# Patient Record
Sex: MALE | Race: WHITE
[De-identification: names, ages, dates, MRNs, and addresses within clinical notes are randomized per-mention and may not be internally consistent; named-entity substitution may affect disease eponyms.]

---

## 2020-09-08 ENCOUNTER — HOSPITAL ENCOUNTER (EMERGENCY)
Dept: HOSPITAL 97 - ER | Age: 65
Discharge: HOME | End: 2020-09-08
Payer: COMMERCIAL

## 2020-09-08 VITALS — SYSTOLIC BLOOD PRESSURE: 157 MMHG | DIASTOLIC BLOOD PRESSURE: 77 MMHG | OXYGEN SATURATION: 96 %

## 2020-09-08 VITALS — TEMPERATURE: 98.1 F

## 2020-09-08 DIAGNOSIS — R11.2: Primary | ICD-10-CM

## 2020-09-08 DIAGNOSIS — E11.9: ICD-10-CM

## 2020-09-08 LAB
ALBUMIN SERPL BCP-MCNC: 4.3 G/DL (ref 3.4–5)
ALP SERPL-CCNC: 94 U/L (ref 45–117)
ALT SERPL W P-5'-P-CCNC: 40 U/L (ref 12–78)
AST SERPL W P-5'-P-CCNC: 16 U/L (ref 15–37)
BLD SMEAR INTERP: (no result)
BUN BLD-MCNC: 17 MG/DL (ref 7–18)
GLUCOSE SERPLBLD-MCNC: 144 MG/DL (ref 74–106)
HCT VFR BLD CALC: 42.7 % (ref 39.6–49)
LIPASE SERPL-CCNC: 52 U/L (ref 73–393)
LYMPHOCYTES # SPEC AUTO: 0.9 K/UL (ref 0.7–4.9)
MORPHOLOGY BLD-IMP: (no result)
PMV BLD: 9 FL (ref 7.6–11.3)
POTASSIUM SERPL-SCNC: 4.2 MMOL/L (ref 3.5–5.1)
RBC # BLD: 4.9 M/UL (ref 4.33–5.43)
UA COMPLETE W REFLEX CULTURE PNL UR: (no result)

## 2020-09-08 PROCEDURE — 96374 THER/PROPH/DIAG INJ IV PUSH: CPT

## 2020-09-08 PROCEDURE — 96361 HYDRATE IV INFUSION ADD-ON: CPT

## 2020-09-08 PROCEDURE — 80076 HEPATIC FUNCTION PANEL: CPT

## 2020-09-08 PROCEDURE — 81003 URINALYSIS AUTO W/O SCOPE: CPT

## 2020-09-08 PROCEDURE — 74176 CT ABD & PELVIS W/O CONTRAST: CPT

## 2020-09-08 PROCEDURE — 81015 MICROSCOPIC EXAM OF URINE: CPT

## 2020-09-08 PROCEDURE — 76377 3D RENDER W/INTRP POSTPROCES: CPT

## 2020-09-08 PROCEDURE — 80048 BASIC METABOLIC PNL TOTAL CA: CPT

## 2020-09-08 PROCEDURE — 83690 ASSAY OF LIPASE: CPT

## 2020-09-08 PROCEDURE — 99284 EMERGENCY DEPT VISIT MOD MDM: CPT

## 2020-09-08 PROCEDURE — 85025 COMPLETE CBC W/AUTO DIFF WBC: CPT

## 2020-09-08 PROCEDURE — 96375 TX/PRO/DX INJ NEW DRUG ADDON: CPT

## 2020-09-08 PROCEDURE — 36415 COLL VENOUS BLD VENIPUNCTURE: CPT

## 2020-09-08 NOTE — EDPHYS
Physician Documentation                                                                           

 [facility]                                                                 

Name: Esdras Collier                                                                                

Age: 65 yrs                                                                                       

Sex: Male                                                                                         

: 1955                                                                                   

MRN: [id number]                                                                                   

Arrival Date: 2020                                                                          

Time: 14:32                                                                                       

Account#: [id number]                                                                            

Bed 20                                                                                            

Private MD: Dimas Nunez                                                                         

ED Physician Graeme Justice                                                                         

HPI:                                                                                              

                                                                                             

15:00 This 65 yrs old  Male presents to ER via Wheelchair with complaints of         cp  

      Abdominal Pain, Vomiting.                                                                   

15:00 The patient presents with abdominal pain right lower quadrant. Onset: The               cp  

      symptoms/episode began/occurred today, at 11:00. The symptoms radiate to right groin.       

      Associated signs and symptoms: Pertinent positives: nausea, vomiting, Pertinent             

      negatives: blood in stools, chest pain, constipation, diarrhea, fever. Severity of          

      pain: in the emergency department the pain is unchanged despite home interventions.         

                                                                                                  

Historical:                                                                                       

- Allergies:                                                                                      

14:43 No Known Drug Allergies;                                                                ll1 

- Home Meds:                                                                                      

15:37 Metformin Oral [Active];                                                                jl7 

- PMHx:                                                                                           

15:37 Diabetes - NIDDM;                                                                       jl7 

- PSHx:                                                                                           

14:43 Hernia repair;                                                                          ll1 

                                                                                                  

- Immunization history:: Flu vaccine is up to date.                                               

- Social history:: Smoking status: Patient denies any tobacco usage or history of.                

                                                                                                  

                                                                                                  

ROS:                                                                                              

15:05 Constitutional: Negative for body aches, chills, fever, poor PO intake.                 cp  

15:05 Eyes: Negative for injury, pain, redness, and discharge.                                cp  

15:05 Cardiovascular: Negative for chest pain.                                                    

15:05 Respiratory: Negative for cough, shortness of breath, wheezing.                             

15:05 Abdomen/GI: Positive for abdominal pain, nausea and vomiting, Negative for diarrhea,        

      constipation, anorexia, black/tarry stool, rectal bleeding.                                 

15:05 Back: Negative for pain at rest, radiated pain.                                             

15:05 : Negative for urinary symptoms.                                                          

15:05 Skin: Negative for rash.                                                                    

15:05 Neuro: Negative for dizziness, weakness.                                                    

15:05 All other systems are negative.                                                             

                                                                                                  

Exam:                                                                                             

15:10 Constitutional: The patient appears in no acute distress, alert, awake, non-toxic, well cp  

      developed, well nourished, obese.                                                           

15:10 Head/Face:  Normocephalic, atraumatic.                                                  cp  

15:10 Eyes: Periorbital structures: appear normal, Conjunctiva: normal, no exudate, no            

      injection, Sclera: no appreciated abnormality, Lids and lashes: appear normal,              

      bilaterally.                                                                                

15:10 ENT: External ear(s): are unremarkable, Nose: is normal, Mouth: Lips: moist, Oral           

      mucosa: moist, Posterior pharynx: Airway: no evidence of obstruction, patent.               

15:10 Chest/axilla: Inspection: normal, Palpation: is normal, no crepitus, no tenderness.         

15:10 Cardiovascular: Rate: normal, Rhythm: regular.                                              

15:10 Respiratory: the patient does not display signs of respiratory distress,  Respirations:     

      normal, no use of accessory muscles, no retractions, labored breathing, is not present,     

      Breath sounds: are clear throughout, no decreased breath sounds.                            

15:10 Abdomen/GI: Inspection: obese Bowel sounds: active, all quadrants, Palpation: soft, in      

      all quadrants, mild abdominal tenderness, in the right lower quadrant, rebound              

      tenderness, is not appreciated, voluntary guarding, is not appreciated, involuntary         

      guarding, is not appreciated.                                                               

15:10 Back: pain, is absent, ROM is normal.                                                       

15:10 Skin: cellulitis, is not appreciated, no rash present.                                      

15:10 Neuro: Orientation: to person, place \T\ time. Mentation: is normal.                        

                                                                                                  

Vital Signs:                                                                                      

14:42  / 86; Pulse 66; Resp 18; Temp 98.1; Pulse Ox 96% on R/A; Pain 10/10;             ll1 

15:39  / 88; Pulse 71; Resp 17 S; Pulse Ox 91% on R/A;                                  jl7 

16:32  / 77; Pulse 61; Resp 18 S; Pulse Ox 96% on R/A;                                  jl7 

                                                                                                  

MDM:                                                                                              

14:47 Patient medically screened.                                                             cp  

15:00 Differential diagnosis: appendicitis, bowel obstruction, cholecystitis, Cholelithiasis, cp  

      diverticulitis, gastritis, non-specific abd pain, pancreatitis, Pyelonephritis,             

      Testicular Torsion, Ureterolithiasis, urinary tract infection.                              

16:31 Data reviewed: vital signs, nurses notes, lab test result(s), radiologic studies, CT    cp  

      scan. ED course: Spoke with radiologist, DR Betancourt, CT scan of abdomen does not show       

      signs of appendicitis.                                                                      

                                                                                                  

                                                                                             

14:49 Order name: Basic Metabolic Panel; Complete Time: 15:57                                 cp  

                                                                                             

15:57 Interpretation: Normal except: GLUC 144.                                                cp  

                                                                                             

14:49 Order name: CBC with Diff; Complete Time: 16:30                                         cp  

                                                                                             

15:57 Interpretation: Normal except: SANFORD% 87.6; LYM% 8.6; MN% 2.8; NEUT A 9.5.                cp  

                                                                                             

14:49 Order name: Hepatic Function; Complete Time: 15:57                                      cp  

                                                                                             

15:58 Interpretation: Normal except: TP 8.8; GLOB 4.5; A/G 1.0.                               cp  

                                                                                             

14:49 Order name: Lipase; Complete Time: 15:57                                                cp  

                                                                                             

14:49 Order name: Urine Microscopic Only; Complete Time: 17:32                                cp  

                                                                                             

16:30 Order name: CBC Smear Scan; Complete Time: 16:30                                        EDMS

                                                                                             

14:49 Order name: IV Saline Lock; Complete Time: 15:36                                        cp  

                                                                                             

14:49 Order name: Labs collected and sent; Complete Time: 15:36                               cp  

                                                                                             

15:22 Order name: CT Stone Protocol; Complete Time: 16:30                                     cp  

                                                                                             

16:43 Order name: Urine Dipstick--Ancillary (enter results); Complete Time: 16:55             em1 

                                                                                             

14:49 Order name: Urine Dipstick-Ancillary (obtain specimen); Complete Time: 16:42            cp  

                                                                                             

16:31 Order name: PO challenge; Complete Time: 17:31                                          cp  

                                                                                                  

Administered Medications:                                                                         

15:10 Drug: NS 0.9% 500 ml Route: IV; Rate: bolus; Site: left hand;                           jl7 

16:00 Follow up: Response: No adverse reaction; IV Status: Completed infusion; IV Intake:     jl7 

      5000ml                                                                                      

15:10 Drug: Zofran (Ondansetron) 4 mg Route: IVP; Site: left hand;                            jl7 

15:20 Follow up: Response: No adverse reaction; Nausea is decreased                           jl7 

15:10 Drug: morphine 4 mg Route: IVP; Site: left hand;                                        jl7 

15:36 Follow up: Response: No adverse reaction; Pain is decreased                             jl7 

15:23 Drug: Phenergan 25 mg Route: IVP; Site: left hand;                                      jl7 

15:45 Follow up: Response: No adverse reaction; Nausea is decreased                           jl7 

                                                                                                  

                                                                                                  

Disposition:                                                                                      

18:47 Co-signature as Attending Physician, Graeme Justice MD.                                    rn  

                                                                                                  

Disposition:                                                                                      

20 17:32 Discharged to Home. Impression: Lower abdominal pain, unspecified, Nausea and      

  vomiting.                                                                                       

- Condition is Stable.                                                                            

- Discharge Instructions: Abdominal Pain, Adult, Nausea and Vomiting, Adult.                      

- Prescriptions for Bentyl 20 mg Oral Tablet - take 1 tablet by ORAL route every 6                

  hours As needed; 20 tablet. promethazine 25 mg Oral Tablet - take 1 tablet by ORAL              

  route every 6 hours As needed; 20 tablet.                                                       

- Medication Reconciliation Form, Thank You Letter, Antibiotic Education, Prescription            

  Opioid Use form.                                                                                

- Follow up: Private Physician; When: 1 - 2 days; Reason: Worsening of condition.                 

- Problem is new.                                                                                 

- Symptoms have improved.                                                                         

                                                                                                  

                                                                                                  

                                                                                                  

Signatures:                                                                                       

Dispatcher [employer]                           EDMS                                                 

Tracie Gillis, FNP-C                 FNP-Ckb                                                   

Graeme Justice MD MD   rn                                                   

Patric Novoa PA PA cp Leal, Jahala RN                        RN   jl7                                                  

Aydee Remy RN                       RN   ll1                                                  

                                                                                                  

Corrections: (The following items were deleted from the chart)                                    

17:49 17:32 2020 17:32 Discharged to Home. Impression: Lower abdominal pain,            jl7 

      unspecified; Nausea and vomiting. Condition is Stable. Discharge Instructions:              

      Abdominal Pain, Adult, Nausea and Vomiting, Adult. Prescriptions for Bentyl 20 mg Oral      

      Tablet - take 1 tablet by ORAL route every 6 hours As needed; 20 tablet, promethazine       

      25 mg Oral Tablet - take 1 tablet by ORAL route every 6 hours As needed; 20 tablet. and     

      Forms are Medication Reconciliation Form, Thank You Letter, Antibiotic Education,           

      Prescription Opioid Use. Follow up: Private Physician; When: 1 - 2 days; Reason:            

      Worsening of condition. Problem is new. Symptoms have improved. kb                          

                                                                                                  

**************************************************************************************************

## 2020-09-08 NOTE — ER
Nurse's Notes                                                                                     

 [facility]                                                                 

Name: Esdras Collier                                                                                

Age: 65 yrs                                                                                       

Sex: Male                                                                                         

: 1955                                                                                   

MRN: [id number]                                                                                   

Arrival Date: 2020                                                                          

Time: 14:32                                                                                       

Account#: [id number]                                                                            

Bed 20                                                                                            

Private MD: Dimas Nunez                                                                         

Diagnosis: Lower abdominal pain, unspecified;Nausea and vomiting                                  

                                                                                                  

Presentation:                                                                                     

                                                                                             

14:42 Chief complaint: Patient states: Abdominal pain with N/V since 11am today. No known     ll1 

      fever. Coronavirus screen: Client denies travel out of the [place] in the last 14 days. At     

      this time, the client does not indicate any symptoms associated with coronavirus-19.        

      Ebola Screen: Patient denies travel to an Ebola-affected area in the 21 days before         

      illness onset. Initial Sepsis Screen: Does the patient meet any 2 criteria? No.             

      Patient's initial sepsis screen is negative. Risk Assessment: Do you want to hurt           

      yourself or someone else? Patient reports no desire to harm self or others. Onset of        

      symptoms was 2020.                                                            

14:42 Method Of Arrival: Wheelchair                                                           ll1 

14:42 Acuity: GRIFFIN 3                                                                           ll1 

                                                                                                  

Historical:                                                                                       

- Allergies:                                                                                      

14:43 No Known Drug Allergies;                                                                ll1 

- Home Meds:                                                                                      

15:37 Metformin Oral [Active];                                                                jl7 

- PMHx:                                                                                           

15:37 Diabetes - NIDDM;                                                                       jl7 

- PSHx:                                                                                           

14:43 Hernia repair;                                                                          ll1 

                                                                                                  

- Immunization history:: Flu vaccine is up to date.                                               

- Social history:: Smoking status: Patient denies any tobacco usage or history of.                

                                                                                                  

                                                                                                  

Screening:                                                                                        

15:25 Abuse screen: Denies threats or abuse. Denies injuries from another. Nutritional        jl7 

      screening: No deficits noted. Tuberculosis screening: No symptoms or risk factors           

      identified. Fall Risk IV access (20 points). Total Whittaker Fall Scale indicates No Risk       

      (0-24 pts).                                                                                 

                                                                                                  

Assessment:                                                                                       

15:00 General: Appears in no apparent distress. uncomfortable, ill, Behavior is calm,         jl7 

      cooperative, appropriate for age. Pain: Complains of pain in right lower quadrant Pain      

      radiates to pelvis Pain currently is 10 out of 10 on a pain scale. Pain began 4 hours       

      ago. Is continuous. Neuro: Level of Consciousness is awake, alert, obeys commands,          

      Oriented to person, place, time, situation. Cardiovascular: Denies chest pain.              

      Respiratory: Airway is patent Respiratory effort is even, unlabored, Respiratory            

      pattern is regular, symmetrical. GI: Abdomen is round non-distended, Bowel sounds           

      present X 4 quads. Abd is soft Reports nausea, vomiting, Patient currently denies           

      diarrhea. Derm: Skin is diaphoretic, Skin is normal, Skin temperature is cool.              

16:00 Reassessment: Patient appears in no apparent distress at this time. Patient and/or      jl7 

      family updated on plan of care and expected duration. Pain level reassessed. Patient is     

      alert, oriented x 3, equal unlabored respirations, skin warm/dry/pink. Patient states       

      feeling better.                                                                             

17:00 Reassessment: Patient appears in no apparent distress at this time. No changes from     jl7 

      previously documented assessment. Patient and/or family updated on plan of care and         

      expected duration. Pain level reassessed. Patient is alert, oriented x 3, equal             

      unlabored respirations, skin warm/dry/pink.                                                 

                                                                                                  

Vital Signs:                                                                                      

14:42  / 86; Pulse 66; Resp 18; Temp 98.1; Pulse Ox 96% on R/A; Pain 10/10;             ll1 

15:39  / 88; Pulse 71; Resp 17 S; Pulse Ox 91% on R/A;                                  jl7 

16:32  / 77; Pulse 61; Resp 18 S; Pulse Ox 96% on R/A;                                  jl7 

                                                                                                  

ED Course:                                                                                        

14:32 Patient arrived in ED.                                                                  ag5 

14:32 Dimas Nunez MD is Private Physician.                                                 ag5 

14:43 Triage completed.                                                                       ll1 

14:43 Arm band placed on Patient placed in an exam room, on a stretcher.                      ll1 

14:46 Patric Novoa PA is PHCP.                                                                cp  

14:46 Graeme Justice MD is Attending Physician.                                                cp  

14:51 Haroon Castillo RN is Primary Nurse.                                                      jl7 

15:00 Patient has correct armband on for positive identification. Placed in gown. Bed in low  jl7 

      position. Call light in reach. Side rails up X 1. Pulse ox on. NIBP on. Warm blanket        

      given.                                                                                      

15:05 Missed attempt(s): 20 gauge in right antecubital area. Bleeding controlled, band aid    jl7 

      applied, catheter tip intact.                                                               

15:10 Missed attempt(s): 20 gauge in left antecubital area. Bleeding controlled, band aid     jl7 

      applied, catheter tip intact.                                                               

15:15 Initial lab(s) drawn, by me, sent to lab. Inserted saline lock: 22 gauge in left hand,  jl7 

      using aseptic technique. Blood collected.                                                   

16:08 CT Stone Protocol In Process Unspecified.                                               EDMS

17:48 No provider procedures requiring assistance completed. IV discontinued, intact,         jl7 

      bleeding controlled, No redness/swelling at site. Pressure dressing applied.                

                                                                                                  

Administered Medications:                                                                         

15:10 Drug: NS 0.9% 500 ml Route: IV; Rate: bolus; Site: left hand;                           jl7 

16:00 Follow up: Response: No adverse reaction; IV Status: Completed infusion; IV Intake:     jl7 

      5000ml                                                                                      

15:10 Drug: Zofran (Ondansetron) 4 mg Route: IVP; Site: left hand;                            jl7 

15:20 Follow up: Response: No adverse reaction; Nausea is decreased                           jl7 

15:10 Drug: morphine 4 mg Route: IVP; Site: left hand;                                        jl7 

15:36 Follow up: Response: No adverse reaction; Pain is decreased                             jl7 

15:23 Drug: Phenergan 25 mg Route: IVP; Site: left hand;                                      jl7 

15:45 Follow up: Response: No adverse reaction; Nausea is decreased                           jl7 

                                                                                                  

                                                                                                  

Intake:                                                                                           

16:00 IV: 5000ml; Total: 5000ml.                                                              jl7 

                                                                                                  

Outcome:                                                                                          

17:32 Discharge ordered by MD.                                                                kb  

17:48 Discharged to home ambulatory.                                                          jl7 

17:48 Condition: stable                                                                           

17:48 Discharge instructions given to patient, Instructed on discharge instructions, follow       

      up and referral plans. medication usage, Demonstrated understanding of instructions,        

      follow-up care, medications, Prescriptions given X 2.                                       

17:49 Patient left the ED.                                                                    jl7 

                                                                                                  

Signatures:                                                                                       

Dispatcher [employer]                           EDMS                                                 

Tracie Gillis, FNP-C                 FNP-Ckb                                                   

Patric Novoa PA PA cp Leal, Jahala RN                        RN   jl7                                                  

Annetta Avalos                                ag5                                                  

Aydee Remy, RN                       RN   ll1                                                  

                                                                                                  

**************************************************************************************************

## 2020-09-08 NOTE — RAD REPORT
EXAM DESCRIPTION:  CT - Stone Protocol - 9/8/2020 4:08 pm

 

CLINICAL HISTORY:  right flank pain

 

COMPARISON:  CT ABD PELVIS W CONTRAST dated 4/4/2015

 

TECHNIQUE:  Axial 5 mm thick CT imaging of the abdomen and pelvis was performed without IV contrast. 
No IV contrast was given because of allergy, abnormal renal function, patient refusal or physician re
quest.

 

No oral contrast.

 

All CT scans are performed using dose optimization technique as appropriate and may include automated
 exposure control or mA/KV adjustment according to patient size.

 

FINDINGS:  Motion degradation accentuates lung base interstitial pattern. No mass or consolidations s
een. Cardiomegaly is present without pericardial effusion.

 

The liver, spleen and pancreas show no suspicious findings on non-contrast imaging. Cholecystectomy c
lips are present. No biliary tree dilatation.

 

No hydronephrosis or suspicious renal mass.  No significant adrenal finding. Isodense renal masses an
d pyelonephritis cannot be excluded in the absence of IV contrast. The urinary bladder is without sig
nificant finding. No significant prostate gland abnormality.

 

No stomach or small bowel acute finding. Moderate stool volume present throughout the colon. Left-julian
ed diverticulosis present without diverticulitis. Small hiatal hernia present.

 

  No free air, free fluid or inflammatory stranding. No mass or bulky lymphadenopathy. Bilateral ingu
inal hernias are present very minimal on the right and small on the left. Since 2015 patient has had 
repair of the periumbilical hernia. No herniation of bowel. No suspicious finding at the repair site.


 

Disc and bony degenerative changes are present. No acute finding.

 

 

IMPRESSION:  Moderate stool volume throughout the colon with prominent left-sided diverticulosis. No 
diverticulitis or acute GI finding.

 

No acute  finding identifiable.

 

Periumbilical hernia repair changes with no acute or worrisome finding at the repair site.

 

Full assessment is limited is the absence of IV contrast.

## 2021-10-09 ENCOUNTER — HOSPITAL ENCOUNTER (EMERGENCY)
Dept: HOSPITAL 97 - ER | Age: 66
LOS: 1 days | Discharge: HOME | End: 2021-10-10
Payer: COMMERCIAL

## 2021-10-09 DIAGNOSIS — Z23: ICD-10-CM

## 2021-10-09 DIAGNOSIS — Z90.49: ICD-10-CM

## 2021-10-09 DIAGNOSIS — T15.01XA: Primary | ICD-10-CM

## 2021-10-09 DIAGNOSIS — E11.9: ICD-10-CM

## 2021-10-09 PROCEDURE — 99283 EMERGENCY DEPT VISIT LOW MDM: CPT

## 2021-10-09 PROCEDURE — 90714 TD VACC NO PRESV 7 YRS+ IM: CPT

## 2021-10-09 PROCEDURE — 08C8XZZ EXTIRPATION OF MATTER FROM RIGHT CORNEA, EXTERNAL APPROACH: ICD-10-PCS

## 2021-10-09 PROCEDURE — 90471 IMMUNIZATION ADMIN: CPT

## 2021-10-10 VITALS — OXYGEN SATURATION: 99 % | TEMPERATURE: 97.5 F

## 2021-10-10 VITALS — DIASTOLIC BLOOD PRESSURE: 75 MMHG | SYSTOLIC BLOOD PRESSURE: 142 MMHG

## 2021-10-10 NOTE — ER
Nurse's Notes                                                                                     

 [facility]                                                                 

Name: Esdras Collier                                                                                

Age: 66 yrs                                                                                       

Sex: Male                                                                                         

: 1955                                                                                   

MRN: [id number]                                                                                   

Arrival Date: 10/09/2021                                                                          

Time: 23:21                                                                                       

Account#: [id number]                                                                            

Bed 26                                                                                            

Private MD:                                                                                       

Diagnosis: Foreign body in cornea, right eye-removed                                              

                                                                                                  

Presentation:                                                                                     

10/09                                                                                             

23:41 Chief complaint: Patient states: Right eye pain, redness for the last couple hours;     lp1 

      reports painting in garage earlier today, may have been exposed to dust. Coronavirus        

      screen: At this time, the client does not indicate any symptoms associated with             

      coronavirus-19. Ebola Screen: No symptoms or risks identified at this time. Initial         

      Sepsis Screen: Does the patient meet any 2 criteria? No. Patient's initial sepsis           

      screen is negative. Does the patient have a suspected source of infection? No.              

      Patient's initial sepsis screen is negative. Risk Assessment: Do you want to hurt           

      yourself or someone else? Patient reports no desire to harm self or others. Onset of        

      symptoms was 2021.                                                              

23:41 Method Of Arrival: Ambulatory                                                           lp1 

23:41 Acuity: GRIFFIN 4                                                                           lp1 

                                                                                                  

Historical:                                                                                       

- Allergies:                                                                                      

23:43 No Known Allergies;                                                                     lp1 

- Home Meds:                                                                                      

23:43 None [Active];                                                                          lp1 

- PMHx:                                                                                           

23:43 Diabetes - NIDDM;                                                                       lp1 

- PSHx:                                                                                           

23:43 Cholecystectomy;                                                                        lp1 

                                                                                                  

- Immunization history:: Adult Immunizations up to date.                                          

- Social history:: Smoking status: Patient denies any tobacco usage or history of.                

- Family history:: not pertinent.                                                                 

                                                                                                  

                                                                                                  

Screenin:43 Abuse screen: Denies threats or abuse. Denies injuries from another. Nutritional        lp1 

      screening: No deficits noted. Tuberculosis screening: No symptoms or risk factors           

      identified. Fall Risk None identified.                                                      

                                                                                                  

Assessment:                                                                                       

10/10                                                                                             

00:24 General: Appears in no apparent distress. Behavior is calm, cooperative. Pain:          df1 

      Complains of pain in right eye. Neuro: No deficits noted. Cardiovascular: No deficits       

      noted. Respiratory: No deficits noted. GI: No deficits noted. : No deficits noted.        

      EENT: No deficits noted. Derm: No deficits noted. Musculoskeletal: No deficits noted.       

                                                                                                  

Vital Signs:                                                                                      

10/09                                                                                             

23:41  / 74; Pulse 78; Resp 18; Temp 97.5(TE); Pulse Ox 99% on R/A; Weight 113.4 kg     lp1 

      (R); Height 6 ft. 0 in. (182.88 cm); Pain 8/10;                                             

10/10                                                                                             

00:31  / 75; Pulse 75; Resp 18; Pulse Ox 99% on R/A;                                    df1 

10/09                                                                                             

23:41 Body Mass Index 33.91 (113.40 kg, 182.88 cm)                                            lp1 

                                                                                                  

Visual Acuity:                                                                                    

00:14 Left Eye Visual acuity 20/50, Pupil size 3 mm, Normal, React To Light, Reactive To      df1 

      Accomodation; Right Eye Visual acuity 20/50, Pupil size 3 mm, Normal, React To Light,       

      Reactive To Accomodation; Both Eyes Visual acuity 20/50; Without Lenses;                    

                                                                                                  

ED Course:                                                                                        

10/09                                                                                             

23:21 Patient arrived in ED.                                                                  wm  

23:33 Patric Mckeon MD is Attending Physician.                                             cortes 

23:42 Pili Back is Primary Nurse.                                                         df1 

23:42 Triage completed.                                                                       lp1 

23:42 Arm band placed on.                                                                     lp1 

10/10                                                                                             

00:11 Patient has correct armband on for positive identification. Bed in low position. Call   df1 

      light in reach.                                                                             

00:32 Assist provider with eye exam of right eye.                                             df1 

00:55 Assist provider with eye exam using slit lamp, Performed by Patric Mckeon MD Dressed   df1 

      with eye patch Patient tolerated well.                                                      

00:58 Ken Verdin MD is Referral Physician.                                              cortes 

01:20 Patient did not have IV access during this emergency room visit.                        df1 

                                                                                                  

Administered Medications:                                                                         

00:56 Drug: Tobramycin Ointment (0.3 %) 1 application Route: Ophthalmic; Site: right eye;     df1 

00:57 Drug: Tetracaine Drops 0.5 % 1 drops Route: Ophthalmic; Site: right eye;                df1 

01:01 Drug: Norco (HYDROcodone-acetaminophen) 10 mg-325 mg 1 tabs {Note: Per provider Norco   df1 

      to be taken at home..} Route: PO;                                                           

01:15 Drug: Tetanus-Diphtheria Toxoid Adult 0.5 ml {: [place]. Exp:         df1 

      2023. Lot #: [id number]. } Route: IM; Site: left deltoid;                                  

                                                                                                  

                                                                                                  

Outcome:                                                                                          

00:59 Discharge ordered by MD.                                                                cortes 

01:20 Discharged to home ambulatory.                                                          df1 

01:20 Condition: good                                                                             

01:20 Discharge instructions given to patient, Instructed on discharge instructions, follow       

      up and referral plans. medication usage, Demonstrated understanding of instructions,        

      follow-up care, medications, Prescriptions given X 1.                                       

01:20 Patient left the ED.                                                                    df1 

                                                                                                  

Signatures:                                                                                       

Patric Mckeon MD MD cha Pena, Laura, RN                         RN   lp1                                                  

Patrizia Ku Dawn                                df1                                                  

                                                                                                  

**************************************************************************************************

## 2021-10-10 NOTE — EDPHYS
Physician Documentation                                                                           

 [facility]                                                                 

Name: Esdras Collier                                                                                

Age: 66 yrs                                                                                       

Sex: Male                                                                                         

: 1955                                                                                   

MRN: [id number]                                                                                   

Arrival Date: 10/09/2021                                                                          

Time: 23:21                                                                                       

Account#: [id number]                                                                            

Bed 26                                                                                            

Private MD:                                                                                       

ED Physician Patric Mckeon                                                                      

HPI:                                                                                              

10/10                                                                                             

00:51 This 66 yrs old  Male presents to ER via Ambulatory with complaints of Foreign [place] 

      Body In Eye.                                                                                

00:51 The patient sustained an abrasion, a burn, None. Onset: The symptoms/episode            [place] 

      began/occurred just prior to arrival. Duration: the symptoms are continuous. Aggravated     

      by closing eye, opening eye, pressure, rubbing, Alleviated by nothing. Associated signs     

      and symptoms: Pertinent positives: None. Pertinent negatives: None. Patient does not        

      utilize any form of vision correction. Severity of symptoms: At their worst the             

      symptoms were mild moderate in the emergency department the symptoms are unchanged. The     

      patient has not experienced similar symptoms in the past.                                   

                                                                                                  

Historical:                                                                                       

- Allergies:                                                                                      

10/09                                                                                             

23:43 No Known Allergies;                                                                     lp1 

- Home Meds:                                                                                      

23:43 None [Active];                                                                          lp1 

- PMHx:                                                                                           

23:43 Diabetes - NIDDM;                                                                       lp1 

- PSHx:                                                                                           

23:43 Cholecystectomy;                                                                        lp1 

                                                                                                  

- Immunization history:: Adult Immunizations up to date.                                          

- Social history:: Smoking status: Patient denies any tobacco usage or history of.                

- Family history:: not pertinent.                                                                 

                                                                                                  

                                                                                                  

ROS:                                                                                              

10/10                                                                                             

00:51 Constitutional: Negative for fever, chills, and weight loss, ENT: Negative for injury,  [place] 

      pain, and discharge, Neck: Negative for injury, pain, and swelling, Cardiovascular:         

      Negative for chest pain, palpitations, and edema, Respiratory: Negative for shortness       

      of breath, cough, wheezing, and pleuritic chest pain, Abdomen/GI: Negative for              

      abdominal pain, nausea, vomiting, diarrhea, and constipation, Back: Negative for injury     

      and pain, : Negative for injury, bleeding, discharge, and swelling, MS/Extremity:         

      Negative for injury and deformity, Skin: Negative for injury, rash, and discoloration,      

      Neuro: Negative for headache, weakness, numbness, tingling, and seizure, Psych:             

      Negative for depression, anxiety, suicide ideation, homicidal ideation, and                 

      hallucinations, Allergy/Immunology: Negative for hives, rash, and allergies, Endocrine:     

      Negative for neck swelling, polydipsia, polyuria, polyphagia, and marked weight             

      changes, Hematologic/Lymphatic: Negative for swollen nodes, abnormal bleeding, and          

      unusual bruising.                                                                           

      Eyes: Positive for foreign body sensation, pain, photophobia.                               

                                                                                                  

Exam:                                                                                             

00:51 Constitutional:  This is a well developed, well nourished patient who is awake, alert,  [place] 

      and in no acute distress. Head/Face:  Normocephalic, atraumatic. ENT:  Nares patent. No     

      nasal discharge, no septal abnormalities noted.  Tympanic membranes are normal and          

      external auditory canals are clear.  Oropharynx with no redness, swelling, or masses,       

      exudates, or evidence of obstruction, uvula midline.  Mucous membranes moist. Neck:         

      Trachea midline, no thyromegaly or masses palpated, and no cervical lymphadenopathy.        

      Supple, full range of motion without nuchal rigidity, or vertebral point tenderness.        

      No Meningismus. Chest/axilla:  Normal chest wall appearance and motion.  Nontender with     

      no deformity.  No lesions are appreciated. Cardiovascular:  Regular rate and rhythm         

      with a normal S1 and S2.  No gallops, murmurs, or rubs.  Normal PMI, no JVD.  No pulse      

      deficits. Respiratory:  Lungs have equal breath sounds bilaterally, clear to                

      auscultation and percussion.  No rales, rhonchi or wheezes noted.  No increased work of     

      breathing, no retractions or nasal flaring. Abdomen/GI:  Soft, non-tender, with normal      

      bowel sounds.  No distension or tympany.  No guarding or rebound.  No evidence of           

      tenderness throughout. Back:  No spinal tenderness.  No costovertebral tenderness.          

      Full range of motion. Male :  Normal genitalia with no discharge or lesions. Skin:        

      Warm, dry with normal turgor.  Normal color with no rashes, no lesions, and no evidence     

      of cellulitis. MS/ Extremity:  Pulses equal, no cyanosis.  Neurovascular intact.  Full,     

      normal range of motion. Neuro:  Awake and alert, GCS 15, oriented to person, place,         

      time, and situation.  Cranial nerves II-XII grossly intact.  Motor strength 5/5 in all      

      extremities.  Sensory grossly intact.  Cerebellar exam normal.  Normal gait. Psych:         

      Awake, alert, with orientation to person, place and time.  Behavior, mood, and affect       

      are within normal limits.                                                                   

00:51 Eyes: Pupils: no acute changes, equal, round, and reactive to light and accomodation,       

      Extraocular movements: intact throughout, Conjunctiva: normal, no chemosis, no              

      excoriation, no exudate, no injection, no subconjunctival hemorrhage no abnormal            

      tearing, Corneas: foreign body, on the right, at  6 o'clock, a fluorescein strip            

      employed to appreciate the findings, Sclera: injected, Anterior chamber: normal, no         

      acute changes, Lids and lashes: appear normal, no acute changes, Visual fields: are         

      intact, no acute changes.                                                                   

                                                                                                  

Vital Signs:                                                                                      

10/09                                                                                             

23:41  / 74; Pulse 78; Resp 18; Temp 97.5(TE); Pulse Ox 99% on R/A; Weight 113.4 kg     lp1 

      (R); Height 6 ft. 0 in. (182.88 cm); Pain 8/10;                                             

10/10                                                                                             

00:31  / 75; Pulse 75; Resp 18; Pulse Ox 99% on R/A;                                    df1 

10/09                                                                                             

23:41 Body Mass Index 33.91 (113.40 kg, 182.88 cm)                                            lp1 

                                                                                                  

Visual Acuity:                                                                                    

00:14 Left Eye Visual acuity 20/50, Pupil size 3 mm, Normal, React To Light, Reactive To      df1 

      Accomodation; Right Eye Visual acuity 20/50, Pupil size 3 mm, Normal, React To Light,       

      Reactive To Accomodation; Both Eyes Visual acuity 20/50; Without Lenses;                    

                                                                                                  

MDM:                                                                                              

10/09                                                                                             

23:33 Patient medically screened.                                                             [facility] 

                                                                                                  

10/09                                                                                             

23:34 Order name: Eye Tray; Complete Time: 23:42                                              [facility] 

10/10                                                                                             

00:51 Order name: Misc. Order: patch to right; Complete Time: 00:57                           [facility] 

                                                                                                  

Administered Medications:                                                                         

10/10                                                                                             

00:56 Drug: Tobramycin Ointment (0.3 %) 1 application Route: Ophthalmic; Site: right eye;     df1 

00:57 Drug: Tetracaine Drops 0.5 % 1 drops Route: Ophthalmic; Site: right eye;                df1 

01:01 Drug: Norco (HYDROcodone-acetaminophen) 10 mg-325 mg 1 tabs {Note: Per provider Norco   df1 

      to be taken at home..} Route: PO;                                                           

01:15 Drug: Tetanus-Diphtheria Toxoid Adult 0.5 ml {: [employer]. Exp:         df1 

      2023. Lot #: [id number]. } Route: IM; Site: left deltoid;                                  

                                                                                                  

                                                                                                  

Disposition Summary:                                                                              

10/10/21 00:59                                                                                    

Discharge Ordered                                                                                 

      Location: Home                                                                          [place] 

      Problem: new                                                                            [place] 

      Symptoms: have improved                                                                 [place] 

      Condition: Stable                                                                       [place] 

      Diagnosis                                                                                   

        - Foreign body in cornea, right eye - removed                                         [place] 

      Followup:                                                                               [place] 

        - With: Ken Verdin MD                                                                

        - When: Today                                                                              

        - Reason: Recheck today's complaints, Continuance of care, Re-evaluation by your           

      physician                                                                                   

      Discharge Instructions:                                                                     

        - Discharge Summary Sheet                                                             [place] 

        - Corneal Abrasion                                                                    cortes 

        - Eye Foreign Body                                                                    c[place] 

        - Corneal Abrasion, Easy-to-Read                                                      [place] 

        - Eye Foreign Body, Easy-to-Read                                                      [place] 

      Forms:                                                                                      

        - Medication Reconciliation Form                                                      [place] 

        - Thank You Letter                                                                    [place] 

        - Antibiotic Education                                                                [place] 

        - Prescription Opioid Use                                                             [place] 

      Prescriptions:                                                                              

        - Tobrex 0.3 % Ophthalmic ointment                                                         

            - apply 1 inch ribbon by OPHTHALMIC route 4 times per day; 3.5 gram; Refills: 0,  [place] 

      Product Selection Permitted                                                                 

Signatures:                                                                                       

Patric Mckeon MD MD cha Pena, Laura RN                         RN   lp1                                                  

Pili Back                                df1                                                  

                                                                                                  

**************************************************************************************************

## 2021-10-29 ENCOUNTER — HOSPITAL ENCOUNTER (EMERGENCY)
Dept: HOSPITAL 97 - ER | Age: 66
LOS: 1 days | Discharge: HOME | End: 2021-10-30
Payer: COMMERCIAL

## 2021-10-29 ENCOUNTER — HOSPITAL ENCOUNTER (EMERGENCY)
Dept: HOSPITAL 97 - ER | Age: 66
Discharge: HOME | End: 2021-10-29
Payer: COMMERCIAL

## 2021-10-29 VITALS — OXYGEN SATURATION: 96 % | TEMPERATURE: 97.9 F

## 2021-10-29 VITALS — SYSTOLIC BLOOD PRESSURE: 150 MMHG | DIASTOLIC BLOOD PRESSURE: 80 MMHG

## 2021-10-29 DIAGNOSIS — R10.31: Primary | ICD-10-CM

## 2021-10-29 DIAGNOSIS — E11.9: ICD-10-CM

## 2021-10-29 DIAGNOSIS — T18.128A: Primary | ICD-10-CM

## 2021-10-29 PROCEDURE — 99284 EMERGENCY DEPT VISIT MOD MDM: CPT

## 2021-10-29 PROCEDURE — 85025 COMPLETE CBC W/AUTO DIFF WBC: CPT

## 2021-10-29 PROCEDURE — 81003 URINALYSIS AUTO W/O SCOPE: CPT

## 2021-10-29 PROCEDURE — 70360 X-RAY EXAM OF NECK: CPT

## 2021-10-29 PROCEDURE — 80048 BASIC METABOLIC PNL TOTAL CA: CPT

## 2021-10-29 PROCEDURE — 36415 COLL VENOUS BLD VENIPUNCTURE: CPT

## 2021-10-29 PROCEDURE — 74177 CT ABD & PELVIS W/CONTRAST: CPT

## 2021-10-29 NOTE — EDPHYS
Physician Documentation                                                                           

 [facility]                                                                 

Name: Esdras Collier                                                                                

Age: 66 yrs                                                                                       

Sex: Male                                                                                         

: 1955                                                                                   

MRN: [id number]                                                                                   

Arrival Date: 10/29/2021                                                                          

Time: 17:52                                                                                       

Account#: [id number]                                                                            

Bed 4                                                                                             

Private MD:                                                                                       

ED Physician Mateo Walden                                                                    

HPI:                                                                                              

10/29                                                                                             

20:51 This 66 yrs old  Male presents to ER via Ambulatory with complaints of Foreign kb  

      Body In Throat - candy.                                                                     

20:51 The patient or guardian reports the patient has a suspected foreign body, that has been kb  

      ingested. The reported likely foreign body is "sticky candy". Onset: The                    

      symptoms/episode began/occurred 1.5 hour(s) ago. Current symptoms: foreign body             

      sensation. Treatment Prior to Arrival: none. The patient has not experienced similar        

      symptoms in the past. The patient has not recently seen a physician. Pt states he ate       

      some sticky candy and feels like it is stuck in esophagus. States he has tried drinking     

      fluids to push it down, but he it comes back up.                                            

                                                                                                  

Historical:                                                                                       

- Allergies:                                                                                      

18:03 No Known Allergies;                                                                     aa5 

- PMHx:                                                                                           

18:03 Diabetes - NIDDM;                                                                       aa5 

- PSHx:                                                                                           

18:03 Cholecystectomy;                                                                        aa5 

                                                                                                  

- Immunization history:: Client reports receiving the 2nd dose of the Covid vaccine.              

- Social history:: Smoking status: Patient denies any tobacco usage or history of.                

                                                                                                  

                                                                                                  

ROS:                                                                                              

20:51 Constitutional: Negative for fever, chills, and weight loss.                            kb  

20:51 ENT: Positive for foreign body sensation.                                                   

20:51 All other systems are negative.                                                             

                                                                                                  

Exam:                                                                                             

20:51 Constitutional:  This is a well developed, well nourished patient who is awake, alert,  kb  

      and in no acute distress. Head/Face:  Normocephalic, atraumatic. ENT:  Moist Mucous         

      membranes Cardiovascular:  Regular rate and rhythm with a normal S1 and S2.  No             

      gallops, murmurs, or rubs.  No pulse deficits. Respiratory:  Respirations even and          

      unlabored. No increased work of breathing, no retractions or nasal flaring. Skin:           

      Warm, dry with normal turgor.  Normal color. MS/ Extremity:  Pulses equal, no cyanosis.     

       Neurovascular intact.  Full, normal range of motion. Neuro:  Awake and alert, GCS 15,      

      oriented to person, place, time, and situation. Moves all extremities. Normal gait.         

      Psych:  Awake, alert, with orientation to person, place and time.  Behavior, mood, and      

      affect are within normal limits.                                                            

                                                                                                  

Vital Signs:                                                                                      

17:54  / 87; Pulse 78; Resp 20 S; Temp 98.0(TE); Pulse Ox 98% on R/A; Weight 117.93 kg  aa5 

      (R); Height 6 ft. 1 in. (185.42 cm) (R); Pain 0/10;                                         

18:42  / 93; Pulse 80; Resp 16; Pulse Ox 99% on R/A;                                    ap3 

19:30  / 83; Pulse 70; Resp 19; Temp 97.9; Pulse Ox 96% on R/A; Pain 2/10;              dc2 

21:09  / 80; Pulse 72; Resp 18; Pulse Ox 96% on R/A; Pain 0/10;                         df1 

17:54 Body Mass Index 34.30 (117.93 kg, 185.42 cm)                                            aa5 

                                                                                                  

MDM:                                                                                              

17:55 Patient medically screened.                                                             kb  

20:50 Data reviewed: vital signs, nurses notes. Data interpreted: Pulse oximetry: on room air kb  

      is 96 %. Interpretation: normal. Counseling: I had a detailed discussion with the           

      patient and/or guardian regarding: the historical points, exam findings, and any            

      diagnostic results supporting the discharge/admit diagnosis, radiology results, the         

      need for outpatient follow up, a gastroenterologist, to return to the emergency             

      department if symptoms worsen or persist or if there are any questions or concerns that     

      arise at home. ED course: Pt states he believes the candy is moving down. Pt is now         

      able to tolerate PO intake and would like to go home. Resp even and unlabored. Pt           

      denies shortness of breath.                                                                 

                                                                                                  

10/29                                                                                             

18:01 Order name: Neck Soft Tissue XRAY; Complete Time: 19:57                                 kb  

10/29                                                                                             

18:00 Order name: IV Start; Complete Time: 18:42                                              kb  

                                                                                                  

Administered Medications:                                                                         

18:42 Drug: Glucagon 1 mg Route: IVP; Site: right forearm;                                    ap3 

19:30 Follow up: Response: Pain is unchanged, physician notified                              dc2 

20:11 Drug: GlucaGen (glucagon) 1 mg Route: IVP; Site: right antecubital;                     dc2 

20:28 Follow up: Response: Marked relief of symptoms                                          dc2 

                                                                                                  

                                                                                                  

Disposition Summary:                                                                              

10/29/21 20:45                                                                                    

Discharge Ordered                                                                                 

      Location: Home                                                                          kb  

      Condition: Stable                                                                       kb  

      Diagnosis                                                                                   

        - Foreign body in esophagus                                                           kb  

      Followup:                                                                               kb  

        - With: Emergency Department                                                               

        - When: As needed                                                                          

        - Reason: Worsening of condition                                                           

      Followup:                                                                               kb  

        - With: Private Physician                                                                  

        - When: 2 - 3 days                                                                         

        - Reason: Recheck today's complaints, Continuance of care, Re-evaluation by your           

      physician                                                                                   

      Discharge Instructions:                                                                     

        - Discharge Summary Sheet                                                             kb  

        - Swallowed Foreign Body, Adult, Easy-to-Read                                         kb  

      Forms:                                                                                      

        - Medication Reconciliation Form                                                      kb  

        - Thank You Letter                                                                    kb  

        - Antibiotic Education                                                                kb  

        - Prescription Opioid Use                                                             kb  

Addendum:                                                                                         

2021                                                                                        

     23:01 Co-signature as Attending Physician, Mateo Walden MD PA/NP's history reviewed,      m
a2

           patient interviewed, and examined. I agree with assessment and care plan and confirm   

           the diagnosis (es) above.                                                              

                                                                                                  

Signatures:                                                                                       

Dispatcher [employer]                           EDMS                                                 

Tracie Gillis, FNP-C                 FNP-Mikelb                                                   

Yumi Ramos, RN                     RN   aa5                                                  

Mateo Walden MD MD   ma2                                                  

Ladonna Villegas RN                    RN   ap3                                                  

Bev Amaya RN                    RN   dc2                                                  

                                                                                                  

**************************************************************************************************

## 2021-10-29 NOTE — ER
Nurse's Notes                                                                                     

 [facility]                                                                 

Name: Esdras Collier                                                                                

Age: 66 yrs                                                                                       

Sex: Male                                                                                         

: 1955                                                                                   

MRN: [id number]                                                                                   

Arrival Date: 10/29/2021                                                                          

Time: 17:52                                                                                       

Account#: [id number]                                                                            

Bed 4                                                                                             

Private MD:                                                                                       

Diagnosis: Foreign body in esophagus                                                              

                                                                                                  

Presentation:                                                                                     

10/29                                                                                             

17:54 Chief complaint: Patient states: "I was chewing on sticky candy and now it's stuck on   aa5 

      my throat about 1 1/2 hr ago" No respiratory distress noted, no drooling noted.             

17:54 Coronavirus screen: At this time, the client does not indicate any symptoms associated  aa5 

      with coronavirus-19. Ebola Screen: No symptoms or risks identified at this time.            

      Initial Sepsis Screen: Does the patient meet any 2 criteria? No. Patient's initial          

      sepsis screen is negative. Does the patient have a suspected source of infection? No.       

      Patient's initial sepsis screen is negative. Risk Assessment: Do you want to hurt           

      yourself or someone else? Patient reports no desire to harm self or others. Onset of        

      symptoms was 2021.                                                              

17:54 Acuity: GRIFFIN 2                                                                           aa5 

17:54 Method Of Arrival: Ambulatory                                                           aa5 

21:10 Note Pt drank 2 soda with a straw. Pt states he "feels like to piece of food is gone    df1 

      from his throat". LC CTA. Resp even and unlabored. No distress noted.                       

                                                                                                  

Historical:                                                                                       

- Allergies:                                                                                      

18:03 No Known Allergies;                                                                     aa5 

- PMHx:                                                                                           

18:03 Diabetes - NIDDM;                                                                       aa5 

- PSHx:                                                                                           

18:03 Cholecystectomy;                                                                        aa5 

                                                                                                  

- Immunization history:: Client reports receiving the 2nd dose of the Covid vaccine.              

- Social history:: Smoking status: Patient denies any tobacco usage or history of.                

                                                                                                  

                                                                                                  

Screenin:06 Abuse screen: Denies threats or abuse. Nutritional screening: No deficits noted.        aa5 

      Tuberculosis screening: No symptoms or risk factors identified. Fall Risk None              

      identified.                                                                                 

                                                                                                  

Assessment:                                                                                       

17:55 General: Appears comfortable, Behavior is calm, cooperative. Pain: Denies pain. Neuro:  aa5 

      Level of Consciousness is awake, alert, obeys commands, Oriented to person, place,          

      time, situation. Cardiovascular: Patient's skin is warm and dry. Respiratory: Airway is     

      patent Respiratory effort is even, unlabored, Respiratory pattern is regular,               

      symmetrical, Breath sounds are clear bilaterally. Denies shortness of breath. GI:           

      Abdomen is obese, Patient currently denies nausea, vomiting. : No signs and/or            

      symptoms were reported regarding the genitourinary system. EENT: Reports feeling of FB      

      in throat . Derm: Skin is pink, warm \T\ dry.                                               

19:13 Reassessment: Patient and/or family updated on plan of care and expected duration. Pain ap3 

      level reassessed. Patient is alert, oriented x 3, equal unlabored respirations, skin        

      warm/dry/pink.                                                                              

19:30 Reassessment: PT return from bathroom, Pt given soda to drink with straw. Denies sharp  dc2 

      pain or SOB , states can just feel something but does report feels like it may be           

      moving down. Pt appears anxious .                                                           

19:44 Reassessment: Pt calls out to ask for another ginger ale, states feels like it is       dc2 

      helping feel better, additional soda given, NAD noted, continue to monitor.                 

20:28 Reassessment: Pt reports feels that issue has been resolved and is in no discomfort at  dc2 

      this time.                                                                                  

                                                                                                  

Vital Signs:                                                                                      

17:54  / 87; Pulse 78; Resp 20 S; Temp 98.0(TE); Pulse Ox 98% on R/A; Weight 117.93 kg  aa5 

      (R); Height 6 ft. 1 in. (185.42 cm) (R); Pain 0/10;                                         

18:42  / 93; Pulse 80; Resp 16; Pulse Ox 99% on R/A;                                    ap3 

19:30  / 83; Pulse 70; Resp 19; Temp 97.9; Pulse Ox 96% on R/A; Pain 2/10;              dc2 

21:09  / 80; Pulse 72; Resp 18; Pulse Ox 96% on R/A; Pain 0/10;                         df1 

17:54 Body Mass Index 34.30 (117.93 kg, 185.42 cm)                                            aa5 

                                                                                                  

ED Course:                                                                                        

17:52 Patient arrived in ED.                                                                  as  

17:54 Arm band placed on Patient placed in an exam room, on a stretcher.                      aa5 

17:55 Tracie Gillis [facility]-C is [facility]                                                        kb  

17:55 Mateo Walden MD is Attending Physician.                                           kb  

17:55 Ramos, Yumi, RN is Primary Nurse.                                                   aa5 

17:55 Patient has correct armband on for positive identification. Bed in low position. Call   aa5 

      light in reach. Side rails up X2. Pulse ox on. NIBP on.                                     

18:03 Triage completed.                                                                       aa5 

18:42 Inserted saline lock: 22 gauge in right forearm, using aseptic technique.               ap3 

18:45 Neck Soft Tissue XRAY In Process Unspecified.                                           EDMS

19:10 Report given to [place] Ale[place]andre and [place] Pablo.                                                 aa5 

20:29 No provider procedures requiring assistance completed.                                  dc2 

21:26 IV discontinued, intact, bleeding controlled, No redness/swelling at site. Pressure     df1 

      dressing applied.                                                                           

                                                                                                  

Administered Medications:                                                                         

18:42 Drug: Glucagon 1 mg Route: IVP; Site: right forearm;                                    ap3 

19:30 Follow up: Response: Pain is unchanged, physician notified                              dc2 

20:11 Drug: GlucaGen (glucagon) 1 mg Route: IVP; Site: right antecubital;                     dc2 

20:28 Follow up: Response: Marked relief of symptoms                                          dc2 

                                                                                                  

                                                                                                  

Outcome:                                                                                          

20:45 Discharge ordered by MD.                                                                kb  

21:25 Discharged to home ambulatory.                                                          df1 

21:25 Condition: good                                                                             

21:25 Instructed on discharge instructions, follow up and referral plans. Demonstrated            

      understanding of instructions, follow-up care.                                              

21:26 Patient left the ED.                                                                    df1 

                                                                                                  

Signatures:                                                                                       

Dispatcher [employer]                           EDMS                                                 

Tracie Gillis, FNP-C                 FNP-Leyla Fiore                             as                                                   

Yumi Ramos, RN                     RN   aa5                                                  

Ladonna Villegas RN RN   ap3                                                  

Pili Back                                df1                                                  

Bev Amaya RN                    RN   dc2                                                  

                                                                                                  

**************************************************************************************************

## 2021-10-29 NOTE — RAD REPORT
EXAM DESCRIPTION:  RAD - Neck Soft Tissue - 10/29/2021 6:45 pm

 

CLINICAL HISTORY:  FB, not otherwise specified

 

COMPARISON:  None.

 

TECHNIQUE:  Single lateral soft tissue neck exam performed.

 

FINDINGS:  No prevertebral soft tissue thickening. No foreign body or abnormal air density. Epiglotti
s is normal.  Tonsillar and adenoid tissue within normal limits as well.  Advanced mid and lower cerv
ical spine degenerative change spanning C4-C7. The disc spaces are narrowed with endplate spurring ch
anges. Normal thyroid cartilage calcifications are present.

 

IMPRESSION:  No foreign body is distinguishable from the above detailed anatomic structures.

## 2021-10-30 VITALS — DIASTOLIC BLOOD PRESSURE: 62 MMHG | SYSTOLIC BLOOD PRESSURE: 144 MMHG

## 2021-10-30 VITALS — TEMPERATURE: 97.7 F

## 2021-10-30 VITALS — OXYGEN SATURATION: 100 %

## 2021-10-30 LAB
BUN BLD-MCNC: 11 MG/DL (ref 7–18)
GLUCOSE SERPLBLD-MCNC: 140 MG/DL (ref 74–106)
HCT VFR BLD CALC: 42.2 % (ref 39.6–49)
LYMPHOCYTES # SPEC AUTO: 1.1 K/UL (ref 0.7–4.9)
PMV BLD: 8 FL (ref 7.6–11.3)
POTASSIUM SERPL-SCNC: 3.9 MMOL/L (ref 3.5–5.1)
RBC # BLD: 4.66 M/UL (ref 4.33–5.43)

## 2021-10-30 NOTE — EDPHYS
Physician Documentation                                                                           

 [facility]                                                                 

Name: Esdras Collier                                                                                

Age: 66 yrs                                                                                       

Sex: Male                                                                                         

: 1955                                                                                   

MRN: [id number]                                                                                   

Arrival Date: 10/29/2021                                                                          

Time: 23:39                                                                                       

Account#: [id number]                                                                            

Bed 16                                                                                            

Private MD:                                                                                       

ED Physician Marbin Alexis                                                                      

HPI:                                                                                              

10/30                                                                                             

00:51 This 66 yrs old  Male presents to ER via Unassigned with complaints of         kb  

      RLQ/groin pain.                                                                             

00:51 The patient presents with abdominal pain right lower quadrant. Onset: The               kb  

      symptoms/episode began/occurred just prior to arrival. The symptoms do not radiate.         

      Associated signs and symptoms: Pertinent positives: nausea and vomiting. The symptoms       

      are described as constant. Modifying factors: The symptoms are alleviated by nothing,       

      the symptoms are aggravated by movement. Severity of pain: At its worst the pain was        

      moderate severe in the emergency department the pain is unchanged. The patient has          

      experienced a previous episode, states "they couldn't find anything, I went home and        

      slept for 2 days and the pain was gone.". The patient has been recently seen at the         

      [facility] Emergency Department, today, by me, for unrelated         

      complaints, seen for foreign body in throat (candy), resolved. Pt states he left here       

      after being treated for FB in esophagus and started having RLQ/groin pain then nausea       

      and vomiting. .                                                                             

                                                                                                  

Historical:                                                                                       

- Allergies:                                                                                      

10/29                                                                                             

23:43 No Known Allergies;                                                                     cc4 

- PMHx:                                                                                           

10/30                                                                                             

03:41 right groin pain; nausea;                                                               cc4 

                                                                                                  

- Immunization history:: Adult Immunizations unknown.                                             

- Social history:: Patient/guardian denies using tobacco products, Patient/guardian               

  denies using tobacco products, Smoking status: unknown.                                         

                                                                                                  

                                                                                                  

ROS:                                                                                              

00:52 Constitutional: Negative for fever, chills, and weight loss.                            kb  

00:52 Abdomen/GI: Positive for abdominal pain, nausea and vomiting, Negative for diarrhea,        

      constipation.                                                                               

00:52 All other systems are negative.                                                             

                                                                                                  

Exam:                                                                                             

00:52 Constitutional:  This is a well developed, well nourished patient who is awake, alert,  kb  

      and in no acute distress. Head/Face:  Normocephalic, atraumatic. ENT:  Moist Mucous         

      membranes Respiratory:  Respirations even and unlabored. No increased work of               

      breathing, no retractions or nasal flaring. Abdomen/GI:  Soft, non-tender. No               

      distention Skin:  Warm, dry with normal turgor.  Normal color. MS/ Extremity:  Pulses       

      equal, no cyanosis.  Neurovascular intact.  Full, normal range of motion. Neuro:  Awake     

      and alert, GCS 15, oriented to person, place, time, and situation. Moves all                

      extremities. Normal gait. Psych:  Awake, alert, with orientation to person, place and       

      time.  Behavior, mood, and affect are within normal limits.                                 

                                                                                                  

Vital Signs:                                                                                      

10/29                                                                                             

23:43  / 77; Pulse 63; Resp 20; Temp 97.7(O); Pulse Ox 95% on R/A;                      oe  

10/30                                                                                             

00:11  / 56; Pulse 74; Resp 20; Pulse Ox 95% on R/A;                                    cc4 

00:53  / 105; Pulse 66; Resp 20; Pulse Ox 96% on R/A;                                   cc4 

02:13  / 65; Pulse 71; Resp 18; Pulse Ox 95% on R/A;                                    cc4 

02:15  / 67; Pulse 65; Resp 18; Pulse Ox 96% on R/A;                                    cc4 

02:21  / 66; Pulse 70; Resp 16; Pulse Ox 89% on R/A;                                    cc4 

02:30  / 76; Pulse 60; Resp 18; Pulse Ox 95% on R/A;                                    cc4 

02:45  / 67; Pulse 59; Resp 18; Pulse Ox 100% on R/A;                                   cc4 

03:00  / 62; Pulse 73; Resp 18; Pulse Ox 100% on R/A;                                   cc4 

                                                                                                  

MDM:                                                                                              

10/29                                                                                             

23:41 Patient medically screened.                                                             kb  

10/30                                                                                             

00:51 Data reviewed: vital signs, nurses notes. Data interpreted: Pulse oximetry: on room air kb  

      is 95 %. Interpretation: normal.                                                            

02:50 Counseling: I had a detailed discussion with the patient and/or guardian regarding: the kb  

      historical points, exam findings, and any diagnostic results supporting the                 

      discharge/admit diagnosis, lab results, radiology results, the need for outpatient          

      follow up, a family practitioner, to return to the emergency department if symptoms         

      worsen or persist or if there are any questions or concerns that arise at home. ED          

      course: Pt reports pain feels like a pulled muscle in the groin. No abd tenderness, no      

      tenderness on exam of groin area. No testicular pain. No urinary symptoms.                  

                                                                                                  

10/29                                                                                             

23:41 Order name: Basic Metabolic Panel                                                       kb  

10/29                                                                                             

23:41 Order name: CBC with Diff; Complete Time: 00:18                                         kb  

10/29                                                                                             

23:41 Order name: CT Abd/Pelvis - IV Contrast Only                                            kb  

10/29                                                                                             

23:42 Order name: Basic Metabolic Panel; Complete Time: 00:24                                 EDMS

10/30                                                                                             

01:30 Order name: Urine Dipstick-Ancillary; Complete Time: 01:35                              EDMS

10/29                                                                                             

23:41 Order name: IV Saline Lock; Complete Time: 00:51                                        kb  

10/29                                                                                             

23:41 Order name: Labs collected and sent; Complete Time: 00:51                               kb  

10/30                                                                                             

00:53 Order name: Urine Dipstick-Ancillary (obtain specimen); Complete Time: 02:05            kb  

                                                                                                  

Administered Medications:                                                                         

00:14 Drug: Zofran (Ondansetron) 4 mg Route: IVP; Site: right forearm;                        fu  

01:10 Follow up: Response: No adverse reaction; Nausea is decreased                           cc4 

00:14 Drug: NS 0.9% 1000 ml Route: IV; Rate: 1000 ml; Site: right forearm;                    fu  

00:51 Follow up: Response: No adverse reaction; IV Status: Completed infusion; IV Intake:     bb  

      1000ml                                                                                      

01:10 Follow up: Urine output 600 ml; Response: No adverse reaction; IV Status: Completed     cc4 

      infusion; IV Intake: 1000ml                                                                 

00:15 Drug: morphine 4 mg Route: IVP; Site: right forearm;                                    fu  

00:51 Follow up: Response: Pain is unchanged, physician notified                              bb  

01:10 Follow up: Response: No adverse reaction; Pain is unchanged, physician notified         cc4 

01:10 Drug: fentaNYL (PF) 50 mcg Route: IVP; Site: right wrist;                               cc4 

01:40 Follow up: Response: No adverse reaction; Pain is decreased                             cc4 

02:20 Drug: fentaNYL (PF) 25 mcg Route: IVP; Site: right wrist;                               cc4 

03:00 Follow up: Response: No adverse reaction; Pain is decreased                             cc4 

02:25 Drug: Zofran (Ondansetron) 4 mg Route: IVP; Site: right wrist;                          cc4 

03:00 Follow up: Response: No adverse reaction; Nausea is decreased                           cc4 

03:00 Drug: Flexeril (cyclobenzaprine) 10 mg Route: PO;                                       cc4 

03:00 Follow up: Response: No adverse reaction                                                cc4 

03:00 Drug: Ketorolac 30 mg Route: IVP; Site: right wrist;                                    cc4 

03:00 Follow up: Response: No adverse reaction                                                cc4 

                                                                                                  

                                                                                                  

Disposition:                                                                                      

04:46 Co-signature as Attending Physician, Marbin Alexis MD.                                 [id number] 

                                                                                                  

Disposition Summary:                                                                              

10/30/21 02:57                                                                                    

Discharge Ordered                                                                                 

      Location: Home                                                                          kb  

      Condition: Stable                                                                       kb  

      Diagnosis                                                                                   

        - Right groin pain                                                                    kb  

      Followup:                                                                               kb  

        - With: Emergency Department                                                               

        - When: As needed                                                                          

        - Reason: Worsening of condition                                                           

      Followup:                                                                               kb  

        - With: Private Physician                                                                  

        - When: 2 - 3 days                                                                         

        - Reason: Recheck today's complaints, Continuance of care, Re-evaluation by your           

      physician                                                                                   

      Discharge Instructions:                                                                     

        - Discharge Summary Sheet                                                             kb  

        - Muscle Strain, Easy-to-Read                                                         kb  

      Forms:                                                                                      

        - Medication Reconciliation Form                                                      kb  

        - Thank You Letter                                                                    kb  

        - Antibiotic Education                                                                kb  

        - Prescription Opioid Use                                                             kb  

      Prescriptions:                                                                              

        - Cyclobenzaprine 10 mg Oral Tablet                                                        

            - take 1 tablet by ORAL route every 8 hours As needed; 21 tablet; Refills: 0,     kb  

      Product Selection Permitted                                                                 

        - Diclofenac Sodium 75 mg Oral tablet,delayed release (DR/EC)                              

            - take 1 tablet by ORAL route 2 times per day As needed; 30 tablet; Refills: 0,   kb  

      Product Selection Permitted                                                                 

Signatures:                                                                                       

Dispatcher [employer]                           EDTracie Smyth FNP-C FNP-Ckb Umadhay, Felix, RN                      Marbin Ordaz MD MD   7                                                  

Sharri Lizarraga RN RN   4                                                  

Rosamaria Fregoso RN   bb                                                   

                                                                                                  

Corrections: (The following items were deleted from the chart)                                    

02:49 00:52 Constitutional: This is a well developed, well nourished patient who is awake,    kb  

      alert, and in no acute distress. Head/Face: Normocephalic, atraumatic. ENT: Moist           

      Mucous membranes Respiratory: Respirations even and unlabored. No increased work of         

      breathing, no retractions or nasal flaring. Skin: Warm, dry with normal turgor. Normal      

      color. MS/ Extremity: Pulses equal, no cyanosis. Neurovascular intact. Full, normal         

      range of motion. Neuro: Awake and alert, GCS 15, oriented to person, place, time, and       

      situation. Moves all extremities. Normal gait. Psych: Awake, alert, with orientation to     

      person, place and time. Behavior, mood, and affect are within normal limits. kb             

02:50 00:51 The patient has not experienced similar symptoms in the past, kb                  kb  

03:42 10/29 23:43 PMHx: Diabetes - NIDDM; cc4                                                 cc4 

10/30                                                                                             

03:42 10/29 23:43 PSHx: Cholecystectomy; cc4                                                  cc4 

                                                                                                  

**************************************************************************************************

## 2021-10-30 NOTE — RAD REPORT
EXAM DESCRIPTION:  CT - Abdomen   Pelvis W Contrast - 10/30/2021 6:24 am

 

CLINICAL HISTORY:  ABD PAIN

 

COMPARISON:  None.

 

TECHNIQUE:  CT ABDOMEN PELVIS WITH IV CONTRAST on 10/29/2021 11:41 PM CDT

This exam was performed according to our departmental dose-optimization program, which includes autom
ated exposure control, adjustment of the mA and/or kV according to patient size and/or use of iterati
ve reconstruction technique.

 

FINDINGS:  There is mild bibasilar atelectasis. There is mild thickening of the distal esophagus. The
 heart is enlarged.

Abdomen: The liver is normal in appearance. There is no biliary dilatation. Cholecystectomy was perfo
rmed. The pancreas and spleen are normal in appearance. The adrenal glands and kidneys are unremarkab
le.

Abdominal aorta is normal in course and caliber without aneurysm. There is no free air. There is no r
etroperitoneal adenopathy.

Pelvis: There is severe diverticulosis of the distal colon. Urinary bladder is unremarkable. There is
 no free fluid. Appendix is normal.

Skeleton: There are no acute osseous findings. No suspicious bony lesions.

 

IMPRESSION:  Distal colonic diverticulosis without diverticulitis.

 

Electronically signed by:   Jim Soto MD   10/30/2021 2:45 AM CDT Workstation: [id number]

 

 

 

 

Due to temporary technical issues with the PACS/Fluency reporting system, reports are being signed by
 the in house radiologists without review as a courtesy to insure prompt reporting. The interpreting 
radiologist is fully responsible for the content of the report.

## 2021-10-30 NOTE — ER
Nurse's Notes                                                                                     

 [facility]                                                                 

Name: Esdras Collier                                                                                

Age: 66 yrs                                                                                       

Sex: Male                                                                                         

: 1955                                                                                   

MRN: [id number]                                                                                   

Arrival Date: 10/29/2021                                                                          

Time: 23:39                                                                                       

Account#: [id number]                                                                            

Bed 16                                                                                            

Private MD:                                                                                       

Diagnosis: Right groin pain                                                                       

                                                                                                  

Presentation:                                                                                     

10/29                                                                                             

23:43 Acuity: GRIFFIN 4                                                                           cc4 

23:43 Chief complaint: EMS states: he c/o nausea treated with phenergan 12.5 mg IV enroute;   cc4 

      c/o vague right lower abdominal discomfort that reportedly then went to right hip area      

      down into right leg; reported being here earlier for same complaints. Coronavirus           

      screen: Vaccine status: At this time, the client does not indicate any symptoms             

      associated with coronavirus-19. Ebola Screen: Patient negative for fever greater than       

      or equal to 101.5 degrees Fahrenheit, and additional compatible Ebola Virus Disease         

      symptoms No symptoms or risks identified at this time. Initial Sepsis Screen: Does the      

      patient meet any 2 criteria? No. Patient's initial sepsis screen is negative. Initial       

      Sepsis Screen: Does the patient meet any 2 criteria? No. Patient's initial sepsis           

      screen is negative. Does the patient have a suspected source of infection? No.              

      Patient's initial sepsis screen is negative. Risk Assessment: Do you want to hurt           

      yourself or someone else? Patient reports no desire to harm self or others. Onset of        

      symptoms was 2021 at 19:00.                                                     

23:43 Method Of Arrival: EMS: [facility]                                                cc4 

23:45 Care prior to arrival: Medication(s) given: Phenergan, 12.5 mg.                         bb  

                                                                                                  

Triage Assessment:                                                                                

23:43 General: See nursing assessment; arrived via [facility]; paramedic reports giving cc4 

      pt promethazine 12.5 mg IVP PTA enroute for c/o nausea; reports no relief of nausea; no     

      vomiting noted; # 20 g saline lock intact right wrist with no s/sx's of infection or        

      infiltration; VSS..                                                                         

                                                                                                  

Historical:                                                                                       

- Allergies:                                                                                      

23:43 No Known Allergies;                                                                     cc4 

- PMHx:                                                                                           

10/30                                                                                             

03:41 right groin pain; nausea;                                                               cc4 

                                                                                                  

- Immunization history:: Adult Immunizations unknown.                                             

- Social history:: Patient/guardian denies using tobacco products, Patient/guardian               

  denies using tobacco products, Smoking status: unknown.                                         

                                                                                                  

                                                                                                  

Screenin:53 Abuse screen: Denies threats or abuse. Nutritional screening: No deficits noted.        bb  

      Tuberculosis screening: No symptoms or risk factors identified. Fall Risk None              

      identified.                                                                                 

                                                                                                  

Assessment:                                                                                       

10/29                                                                                             

23:43 General: Appears uncomfortable, Behavior is calm, cooperative. Pain: Complains of pain  cc4 

      in right groin Pain radiates to right leg Pain Quality of pain is described as crampy,      

      Pain began Before I left here about 5 hours ago. Neuro: No deficits noted. Level of         

      Consciousness is awake, alert, obeys commands, Oriented to person, place, time,             

      situation. Cardiovascular: Denies chest pain. Respiratory: No deficits noted. Airway is     

      patent Respiratory effort is unlabored, Breath sounds are clear bilaterally. GI: No         

      deficits noted. Abdomen is obese, Bowel sounds present X 4 quads. Abd is soft and non       

      tender X 4 quads. : Denies burning with urination, inability to void, incontinence.       

      EENT: No deficits noted. Eyes clear. Nares are clear Oral mucosa is dry. Derm: No           

      deficits noted. Skin is intact. Musculoskeletal: No deficits noted. Capillary refill <      

      3 seconds, Range of motion: intact in all extremities.                                      

10/30                                                                                             

00:14 Reassessment: Patient appears in no apparent distress at this time. c/o right groin     cc4 

      pain 10/10 on pain scale with nausea; IV NS hung to saline lock right wrist \T\ infusing    

      \T\ open rate with no s/sx's of infiltration noted; medicated for pain \T\ nausea(see         

      orders); stretcher down; SR's up x 2; wife in \T\ bedside ; voiding with no stated            

      difficulty clear ponce yellow urine with dipstick done.                                    

01:10 Reassessment: Awakened from sleep per CT tech \T\ refuses to go to CT unless he receives  cc4

      "something for pain"; BOB Gillis NP notified with new order received; wife not \T\           

      bedside; Fentanyl 50 mcg given slow IVP with CT tech taking pt to CT. Pain: Complains       

      of pain in right groin Pain radiates to right leg Pain currently is 8 out of 10 on a        

      pain scale.                                                                                 

01:35 Reassessment: Patient appears in no apparent distress at this time. Returned from CT    cc4 

      via stretcher; Dozing intermittently; reports pain "About the same"; will con't to          

      monitor.                                                                                    

02:20 Reassessment: Patient appears in no apparent distress at this time. Pain: Complains of  cc4 

      pain in right groin Pain radiates to right leg Pain currently is 5 out of 10 on a pain      

      scale. at worst was 10 out of 10 on a pain scale. level that patient reports is             

      acceptable is 0 out of 10 on a pain scale. Quality of pain is described as crampy.          

      Neuro: Level of Consciousness is awake, alert, Oriented to person, place, time,             

      situation.                                                                                  

02:20 Respiratory: No deficits noted. Airway is patent Respiratory effort is unlabored, KAlfonso    cc4 

      YARA Gillis notified with new order received; fentanyl 25 mcg given slow IVP;               

      requesting medication for nausea.                                                           

02:25 Reassessment: Patient appears in no apparent distress at this time. BOB Gillis NP      cc4 

      notified of request for nausea with new oder received; Zofran 4 mg given slow IVP; no       

      vomiting noted.                                                                             

                                                                                                  

Vital Signs:                                                                                      

10/29                                                                                             

23:43  / 77; Pulse 63; Resp 20; Temp 97.7(O); Pulse Ox 95% on R/A;                      oe  

10/30                                                                                             

00:11  / 56; Pulse 74; Resp 20; Pulse Ox 95% on R/A;                                    cc4 

00:53  / 105; Pulse 66; Resp 20; Pulse Ox 96% on R/A;                                   cc4 

02:13  / 65; Pulse 71; Resp 18; Pulse Ox 95% on R/A;                                    cc4 

02:15  / 67; Pulse 65; Resp 18; Pulse Ox 96% on R/A;                                    cc4 

02:21  / 66; Pulse 70; Resp 16; Pulse Ox 89% on R/A;                                    cc4 

02:30  / 76; Pulse 60; Resp 18; Pulse Ox 95% on R/A;                                    cc4 

02:45  / 67; Pulse 59; Resp 18; Pulse Ox 100% on R/A;                                   cc4 

03:00  / 62; Pulse 73; Resp 18; Pulse Ox 100% on R/A;                                   cc4 

                                                                                                  

ED Course:                                                                                        

10/29                                                                                             

23:39 Patient arrived in ED.                                                                  tt3 

23:41 [facility]                                                        kb  

23:41 Marbin Alexis MD is Attending Physician.                                             kb  

23:43 Arm band placed on right wrist.                                                         cc4 

10/30                                                                                             

00:03 Inserted saline lock: 20 gauge in right forearm, using aseptic technique. Blood         oe  

      collected.                                                                                  

00:51 Basic Metabolic Panel Sent.                                                             bb  

00:53 Patient has correct armband on for positive identification. Bed in low position. Call   bb  

      light in reach. Side rails up X 1. Pulse ox on. NIBP on.                                    

01:06 Sharri Lizarraga, [place] is Primary Nurse.                                                  cc4 

01:36 CT Abd/Pelvis - IV Contrast Only In Process Unspecified.                                EDMS

03:00 No provider procedures requiring assistance completed.                                  cc4 

03:00 IV discontinued, intact, bleeding controlled, No redness/swelling at site. Pressure     cc4 

      dressing applied.                                                                           

03:36 Triage completed.                                                                       cc4 

                                                                                                  

Administered Medications:                                                                         

00:14 Drug: Zofran (Ondansetron) 4 mg Route: IVP; Site: right forearm;                        fu  

01:10 Follow up: Response: No adverse reaction; Nausea is decreased                           cc4 

00:14 Drug: NS 0.9% 1000 ml Route: IV; Rate: 1000 ml; Site: right forearm;                    fu  

00:51 Follow up: Response: No adverse reaction; IV Status: Completed infusion; IV Intake:     bb  

      1000ml                                                                                      

01:10 Follow up: Urine output 600 ml; Response: No adverse reaction; IV Status: Completed     cc4 

      infusion; IV Intake: 1000ml                                                                 

00:15 Drug: morphine 4 mg Route: IVP; Site: right forearm;                                    fu  

00:51 Follow up: Response: Pain is unchanged, physician notified                              bb  

01:10 Follow up: Response: No adverse reaction; Pain is unchanged, physician notified         cc4 

01:10 Drug: fentaNYL (PF) 50 mcg Route: IVP; Site: right wrist;                               cc4 

01:40 Follow up: Response: No adverse reaction; Pain is decreased                             cc4 

02:20 Drug: fentaNYL (PF) 25 mcg Route: IVP; Site: right wrist;                               cc4 

03:00 Follow up: Response: No adverse reaction; Pain is decreased                             cc4 

02:25 Drug: Zofran (Ondansetron) 4 mg Route: IVP; Site: right wrist;                          cc4 

03:00 Follow up: Response: No adverse reaction; Nausea is decreased                           cc4 

03:00 Drug: Flexeril (cyclobenzaprine) 10 mg Route: PO;                                       cc4 

03:00 Follow up: Response: No adverse reaction                                                cc4 

03:00 Drug: Ketorolac 30 mg Route: IVP; Site: right wrist;                                    cc4 

03:00 Follow up: Response: No adverse reaction                                                cc4 

                                                                                                  

                                                                                                  

Intake:                                                                                           

00:51 IV: 1000ml; Total: 1000ml.                                                              bb  

01:10 IV: 1000ml; Total: 2000ml.                                                              cc4 

                                                                                                  

Output:                                                                                           

01:10 Urine: 600ml; Total: 600ml.                                                             cc4 

                                                                                                  

Outcome:                                                                                          

02:57 Discharge ordered by MD. ferro  

03:00 Discharged to home with wife.                                                           cc4 

03:00 Condition: improved                                                                         

03:00 Discharge instructions given to patient, \T\ wife. Instructed on discharge instructions,    

      follow up and referral plans. medication usage, Demonstrated understanding of               

      instructions, follow-up care, medications, Prescriptions given X 2.                         

03:59 Patient left the ED.                                                                    cc4 

                                                                                                  

Signatures:                                                                                       

Dispatcher [employer]                           EDMS                                                 

Tracie Gillis, ZOFIA LOPEZ-Rosamaria Parsons RN RN bb Espinosa, Orlando oe Umadhay, Felix, RN RN                                                      

Tacho Limon                                  3                                                  

Sharri Lizarraga RN                    RN   cc4                                                  

                                                                                                  

Corrections: (The following items were deleted from the chart)                                    

03:11 10/29 23:43 General: Appears uncomfortable, Behavior is calm, cooperative, cc4          cc4 

10/30                                                                                             

03:11 10/29 23:43 Pain: cc4                                                                   cc4 

10/30                                                                                             

03:42 10/29 23:43 PMHx: Diabetes - NIDDM; cc4                                                 cc4 

10/30                                                                                             

03:42 10/29 23:43 PSHx: Cholecystectomy; cc4                                                  cc4 

                                                                                                  

**************************************************************************************************

## 2022-12-22 ENCOUNTER — HOSPITAL ENCOUNTER (EMERGENCY)
Dept: HOSPITAL 97 - ER | Age: 67
Discharge: HOME | End: 2022-12-22
Payer: MEDICARE

## 2022-12-22 VITALS — SYSTOLIC BLOOD PRESSURE: 155 MMHG | DIASTOLIC BLOOD PRESSURE: 70 MMHG | OXYGEN SATURATION: 90 %

## 2022-12-22 VITALS — TEMPERATURE: 98 F

## 2022-12-22 DIAGNOSIS — K40.90: Primary | ICD-10-CM

## 2022-12-22 DIAGNOSIS — R11.2: ICD-10-CM

## 2022-12-22 LAB
ALBUMIN SERPL BCP-MCNC: 3.8 G/DL (ref 3.4–5)
ALP SERPL-CCNC: 76 U/L (ref 45–117)
ALT SERPL W P-5'-P-CCNC: 39 U/L (ref 16–61)
AST SERPL W P-5'-P-CCNC: 20 U/L (ref 15–37)
BUN BLD-MCNC: 15 MG/DL (ref 7–18)
GLUCOSE SERPLBLD-MCNC: 187 MG/DL (ref 74–106)
HCT VFR BLD CALC: 38.5 % (ref 39.6–49)
LIPASE SERPL-CCNC: 74 U/L (ref 73–393)
LYMPHOCYTES # SPEC AUTO: 0.9 K/UL (ref 0.7–4.9)
MAGNESIUM SERPL-MCNC: 1.9 MG/DL (ref 1.6–2.4)
MCV RBC: 91.3 FL (ref 80–100)
NT-PROBNP SERPL-MCNC: 66 PG/ML (ref ?–125)
PMV BLD: 8.1 FL (ref 7.6–11.3)
POTASSIUM SERPL-SCNC: 4 MMOL/L (ref 3.5–5.1)
RBC # BLD: 4.21 M/UL (ref 4.33–5.43)
TROPONIN I SERPL HS-MCNC: 7.2 PG/ML (ref ?–58.9)

## 2022-12-22 PROCEDURE — 81015 MICROSCOPIC EXAM OF URINE: CPT

## 2022-12-22 PROCEDURE — 81003 URINALYSIS AUTO W/O SCOPE: CPT

## 2022-12-22 PROCEDURE — 83735 ASSAY OF MAGNESIUM: CPT

## 2022-12-22 PROCEDURE — 96374 THER/PROPH/DIAG INJ IV PUSH: CPT

## 2022-12-22 PROCEDURE — 96375 TX/PRO/DX INJ NEW DRUG ADDON: CPT

## 2022-12-22 PROCEDURE — 84484 ASSAY OF TROPONIN QUANT: CPT

## 2022-12-22 PROCEDURE — 83880 ASSAY OF NATRIURETIC PEPTIDE: CPT

## 2022-12-22 PROCEDURE — 83690 ASSAY OF LIPASE: CPT

## 2022-12-22 PROCEDURE — 99284 EMERGENCY DEPT VISIT MOD MDM: CPT

## 2022-12-22 PROCEDURE — 93005 ELECTROCARDIOGRAM TRACING: CPT

## 2022-12-22 PROCEDURE — 96361 HYDRATE IV INFUSION ADD-ON: CPT

## 2022-12-22 PROCEDURE — 80053 COMPREHEN METABOLIC PANEL: CPT

## 2022-12-22 PROCEDURE — 85025 COMPLETE CBC W/AUTO DIFF WBC: CPT

## 2022-12-22 PROCEDURE — 36415 COLL VENOUS BLD VENIPUNCTURE: CPT

## 2022-12-22 PROCEDURE — 74177 CT ABD & PELVIS W/CONTRAST: CPT

## 2022-12-22 NOTE — ER
Nurse's Notes                                                                                     

 [facility]                                                                 

Name: Esdras Collier                                                                                

Age: 67 yrs                                                                                       

Sex: Male                                                                                         

: 1955                                                                                   

MRN: [id number]                                                                                   

Arrival Date: 2022                                                                          

Time: 05:16                                                                                       

Account#: [id number]                                                                            

Bed 6                                                                                             

Private MD:                                                                                       

Diagnosis: Inguinal hernia pain, Right lower abdominal pain                                       

                                                                                                  

Presentation:                                                                                     

                                                                                             

05:30 Chief complaint: Patient states: right lower groin pain that radiates to RLQ. pt        as6 

      reports it started a few hours ago and the pain woke him from his sleep. Coronavirus        

      screen: At this time, the client does not indicate any symptoms associated with             

      coronavirus-19. Ebola Screen: No symptoms or risks identified at this time. Initial         

      Sepsis Screen: Does the patient meet any 2 criteria? No. Patient's initial sepsis           

      screen is negative. Does the patient have a suspected source of infection? No.              

      Patient's initial sepsis screen is negative. Risk Assessment: Do you want to hurt           

      yourself or someone else? Patient reports no desire to harm self or others. Onset of        

      symptoms was 2022.                                                             

05:30 Method Of Arrival: Wheelchair                                                           as6 

05:30 Acuity: GRIFFIN 3                                                                           as6 

                                                                                                  

Triage Assessment:                                                                                

05:33 General: Appears ill, obese, Behavior is cooperative, restless. Pain: Complains of pain as6 

      in right femoral area Pain radiates to right lower quadrant Quality of pain is              

      described as radiating, sharp, shooting, stabbing. Neuro: Level of Consciousness is         

      awake, alert, obeys commands, Oriented to person, place, time, situation. Respiratory:      

      Respiratory effort is even, unlabored. GI: Reports lower abdominal pain, nausea,            

      vomiting. Derm: Skin is diaphoretic, Skin is pale.                                          

                                                                                                  

Historical:                                                                                       

- Allergies:                                                                                      

05:32 No Known Allergies;                                                                     as6 

- Home Meds:                                                                                      

05:32 None [Active];                                                                          as6 

- PMHx:                                                                                           

05:32 None;                                                                                   as6 

- PSHx:                                                                                           

05:32 hernia;                                                                                 as6 

                                                                                                  

- Immunization history:: Client reports receiving the 2nd dose of the Covid vaccine,              

  moderna Flu vaccine is up to date.                                                              

- Social history:: Smoking status: Patient denies any tobacco usage or history of.                

                                                                                                  

                                                                                                  

Screenin:34 [facility] ED Fall Risk Assessment (Adult) History of falling in the last 3 months,       as6 

      including since admission No falls in past 3 months (0 pts) Confusion or Disorientation     

      No (0 pts) Intoxicated or Sedated No (0 pts) Impaired Gait Yes (1 pt) Mobility Assist       

      Device Used Yes (1 pt) Altered Elimination No (0 pt) Score/Fall Risk Level 0 - 2 = Low      

      Risk. Abuse screen: Denies threats or abuse. Denies injuries from another. Nutritional      

      screening: No deficits noted. Tuberculosis screening: No symptoms or risk factors           

      identified.                                                                                 

                                                                                                  

Assessment:                                                                                       

06:05 General: Appears uncomfortable, Behavior is calm, cooperative. Neuro: Level of          kd3 

      Consciousness is awake, alert, obeys commands, Oriented to person, place, time,             

      situation. Respiratory: Airway is patent Trachea midline Respiratory effort is even,        

      unlabored, Respiratory pattern is regular, symmetrical. GI: Abdomen is obese, Reports       

      lower abdominal pain, nausea.                                                               

07:05 Reassessment: Patient appears in no apparent distress at this time. No changes from     kc6 

      previously documented assessment. Patient and/or family updated on plan of care and         

      expected duration. Pain level reassessed. Patient is alert, oriented x 3, equal             

      unlabored respirations, skin warm/dry/pink.                                                 

07:05 General: Appears in no apparent distress. uncomfortable, Behavior is calm, cooperative, kc6 

      appropriate for age. Pain: Complains of pain in right femoral area Pain does not            

      radiate. Pain currently is 8 out of 10 on a pain scale. Quality of pain is described as     

      sharp, Is continuous, Alleviated by nothing. Aggravated by increased activity,              

      repositioning, Noted to be resistant to movement, Also complains of no other associated     

      symptoms. Neuro: Bustillos Agitation-Sedation Scale (RASS): 0 - Alert and Calm Level of      

      Consciousness is awake, alert, obeys commands, Oriented to person, place, time,             

      situation, Appropriate for age. Cardiovascular: Heart tones S1 S2 present Capillary         

      refill < 3 seconds Rhythm is sinus rhythm. Respiratory: Airway is patent Trachea            

      midline Respiratory effort is even, unlabored, Respiratory pattern is regular,              

      symmetrical, Breath sounds are clear bilaterally. GI: Abdomen is flat, non-distended,       

      obese, Bowel sounds present X 4 quads. Abd is soft and non tender X 4 quads. Reports        

      lower abdominal pain, nausea, Patient currently denies diarrhea, vomiting. : No signs     

      and/or symptoms were reported regarding the genitourinary system. : Urine is clear.       

      EENT: No signs and/or symptoms were reported regarding the EENT system. Derm: No signs      

      and/or symptoms reported regarding the dermatologic system. Skin is intact, Skin is         

      pink, warm \T\ dry. Musculoskeletal: No signs and/or symptoms reported regarding the        

      musculoskeletal system. Circulation, motion, and sensation intact. Capillary refill < 3     

      seconds, Range of motion: intact in all extremities.                                        

08:05 Reassessment: Patient appears in no apparent distress at this time. No changes from     kc6 

      previously documented assessment. Patient and/or family updated on plan of care and         

      expected duration. Pain level reassessed. Patient is alert, oriented x 3, equal             

      unlabored respirations, skin warm/dry/pink.                                                 

                                                                                                  

Vital Signs:                                                                                      

05:30  / 75; Pulse 62; Resp 17 S; Temp 98.0(O); Pulse Ox 95% on R/A; Weight 122.47 kg   as6 

      (R); Height 6 ft. 0 in. (182.88 cm) (R); Pain 10/10;                                        

06:04  / 88; Pulse 62; Resp 19; Pulse Ox 92% on R/A;                                    kd3 

07:04  / 67; Pulse 67; Resp 18 S; Pulse Ox 92% on R/A;                                  kc6 

08:04  / 70; Pulse 60; Resp 20 S; Pulse Ox 90% on R/A; Pain 8/10;                       kc6 

05:30 Body Mass Index 36.62 (122.47 kg, 182.88 cm)                                            as6 

                                                                                                  

ED Course:                                                                                        

05:16 Patient arrived in ED.                                                                  jj6 

05:21 Tracey Moreno MD is Attending Physician.                                           sd2 

05:30 Abdelrahman Beth, RN is Primary Nurse.                                                    as6 

05:32 Triage completed.                                                                       as6 

05:33 Arm band placed on.                                                                     as6 

05:35 Placed in gown. Bed in low position. Call light in reach. Side rails up X2. Client      as6 

      placed on continuous cardiac and pulse oximetry monitoring. NIBP monitoring applied.        

      Warm blanket given.                                                                         

05:40 Lipase Sent.                                                                            as6 

05:40 Troponin High Sensitivity Sent.                                                         as6 

05:40 BNP Sent.                                                                               as6 

05:40 Magnesium Sent.                                                                         as6 

05:40 CMP Sent.                                                                               as6 

05:40 CBC with Diff Sent.                                                                     as6 

05:45 Inserted saline lock: 20 gauge in right antecubital area, using aseptic technique.      pf1 

06:18 Lab(s) recollected, by me, sent to lab.                                                 wm  

06:18 CBC with Diff Sent.                                                                     wm  

06:56 CT Abd/Pelvis - IV Contrast Only In Process Unspecified.                                EDMS

07:04 Attending Physician role handed off by Tracey Moreno MD                            [id number]

07:04 Shawn Lacy MD is Attending Physician.                                               [id number]

07:19 Urine Microscopic Only Sent.                                                            kc6 

08:21 No provider procedures requiring assistance completed.                                  kc6 

08:33 IV discontinued, intact, bleeding controlled, No redness/swelling at site. Pressure     kc6 

      dressing applied.                                                                           

                                                                                                  

Administered Medications:                                                                         

05:50 Drug: NS 0.9% 1000 ml Route: IV; Rate: 1 bolus; Site: right antecubital;                as6 

07:20 Follow up: Response: No adverse reaction; IV Status: Completed infusion; IV Intake:     kc6 

      1000ml                                                                                      

05:50 Drug: morphine 4 mg Route: IVP; Infused Over: 4 mins; Site: right antecubital;          as6 

07:20 Follow up: Response: No adverse reaction; Pain is unchanged, physician notified; RASS:  kc6 

      Alert and Calm (0)                                                                          

05:50 Drug: Zofran (Ondansetron) 4 mg Route: IVP; Site: right antecubital;                    as6 

07:20 Follow up: Response: No adverse reaction; Nausea is decreased                           kc6 

06:27 Drug: morphine 4 mg Route: IVP; Infused Over: 4 mins; Site: right antecubital;          kd3 

07:21 Follow up: Response: No adverse reaction; Pain is decreased; RASS: Alert and Calm (0)   kc6 

06:27 Drug: Ketorolac 15 mg Route: IVP; Site: right antecubital;                              kd3 

07:21 Follow up: Response: No adverse reaction; Pain is decreased; RASS: Alert and Calm (0)   kc6 

07:48 Drug: Reglan (metoCLOPramide) 10 mg Route: IVP; Site: right antecubital;                kc6 

08:22 Follow up: Response: No adverse reaction; Nausea is decreased                           kc6 

07:48 Drug: morphine 4 mg Route: IVP; Infused Over: 4 mins; Site: right antecubital;          kc6 

08:21 Follow up: Response: No adverse reaction; Pain is decreased; RASS: Alert and Calm (0)   kc6 

                                                                                                  

                                                                                                  

Medication:                                                                                       

06:06 VIS not applicable for this client.                                                     kd3 

                                                                                                  

Intake:                                                                                           

07:20 IV: 1000ml; Total: 1000ml.                                                              kc6 

                                                                                                  

Outcome:                                                                                          

08:18 Discharge ordered by MD.                                                                11

08:21 Condition: stable                                                                       kc6 

08:32 Discharged to home via wheelchair, with significant other.                              kc6 

08:32 Discharge instructions given to patient, Instructed on discharge instructions, follow       

      up and referral plans. medication usage, Demonstrated understanding of instructions,        

      follow-up care, medications, Prescriptions given X 1.                                       

08:33 Patient left the ED.                                                                    kc6 

                                                                                                  

Signatures:                                                                                       

Dispatcher [employer]                           EDMS                                                 

Patrizia Ku Jennifer                           jj6                                                  

Abdelrahman Beth RN                      RN   as6                                                  

Sultana Le RN                      RN   kd3                                                  

Shawn Lacy MD MD   jr11                                                 

Tracey Moreno MD MD   sd2                                                  

Janis Randall RN                   RN   kc6                                                  

Abigail youngblood RN                      RN   pf1                                                  

                                                                                                  

Corrections: (The following items were deleted from the chart)                                    

05:33 05:32 PMHx: right groin pain; as6                                                       as6 

05:33 05:32 PMHx: Nausea; as6                                                                 as6 

                                                                                                  

**************************************************************************************************

## 2022-12-22 NOTE — XMS REPORT
Continuity of Care Document

                          Created on:2022



Patient:ESDRAS PEDERSON

Sex:Male

:1955

External Reference #:[id number]





Demographics







                          Address                   [address] [place] [address]

 

                          Home Phone                [phone]

 

                          Work Phone                [phone]

 

                          Mobile Phone              [phone]

 

                          Email Address             [email or web address]

 

                          Preferred Language        English

 

                          Marital Status            Unknown

 

                          [employer] Affiliation     Unknown

 

                          Race                      Unknown

 

                          Additional Race(s)        Unavailable



                                                    White

 

                          Ethnic Group              Unknown









Author







                          Organization              [facility]

t

 

                          Address                   [address]



                                                    [place] [address]

 

                          Phone                     [phone]









Support







                Name            Relationship    Address         Phone

 

                Esdras Pederson   Employer        [address] [phone]



                                                [place] [address] 

 

                Deb Pederson     Spouse          Unavailable     [phone]









Care Team Providers







                    Name                Role                Phone

 

                    Pcp, Patient Does Not Have A Primary Care Physician +1-000-0



 

                    Joseluis Martinez     Attending Clinician Unavailable

 

                    Vaccine, [place] Pedi Attending Clinician Unavailable

 

                    Wong Harris MD        Attending Clinician [phone]

 

                    WONG HARRIS           Attending Clinician Unavailable

 

                    Ladonna Bueno MD     Attending Clinician [phone]

 

                    Erum Jeffrey Attending Clinician [phone]

[phone]

 

                    ERUM CARLSON Attending Clinician Unavailable

 

                    Nurse, Adc Pob Immunization Attending Clinician Unavailable

 

                    Jarad Lerma DO Attending Clinician [phone]

 

                    KOKI DE SANTIAGO      Attending Clinician Unavailable

 

                    MD KOKI DE SANTIAGO   Attending Clinician Unavailable

 

                    ABAD MILLER     Attending Clinician Unavailable

 

                    Pcp, Patient Does Not Have A Attending Clinician [phone]-

[phone]

 

                    ROSCOE MÁRQUEZ      Attending Clinician Unavailable

 

                    KOKI DE SANTIAGO      Admitting Clinician Unavailable

 

                    MD KOKI DE SANTIAGO   Admitting Clinician Unavailable

 

                    YARA ROSS Admitting Clinician Unavailable









Payers







           Payer Name Policy Type Policy Number Effective Date Expiration Date S

sandra

 

           [employer] [place] [employer]            [id number] 2019            



           OF STATE              1          00:00:00              

 

           [facility]            [id number]     2022            



           (MEDICARE                        00:00:00              



           REPLACEMENT HMO)                                             







Problems







       Condition Condition Condition Status Onset  Resolution Last   Treating Co

mments 

Source



       Name   Details Category        Date   Date   Treatment Clinician        



                                                 Date                 

 

       [id number] Other  Problem                                           Common



              chronic                                                  [facility]



              pain                                                    - [facility]

 

       [id number] Neuropathy Problem                                           Co

mmon



                                                                      Spirit



                                                                      - [facility]

 

       [id number] Tinnitus Problem                                           Com

mon



       102    of both                                                  Spirit



              ears                                                    - [facility]

 

       [id number] Body mass Problem                                           Com

mon



              index                                                   Spirit



              [BMI]                                                   - [facility]



              36.0-36.9,                                                  [facility]

 

       [id number] Morbid Problem                                           Commo

n



       9104   (severe)                                                  Spirit



              obesity                                                  - [facility]



              due to                                                  [facility]

 

       [id number] Mixed  Problem                                           Common



              hyperlipid                                                  [facility]irit



              emia                                                    - [facility]

 

       [id number] Erectile Problem                                           Comm

on



              dysfunctio                                                  [facility]



              n,                                                      - [facility]



              unspecifie                                                  [facility] erectile                                                  [facility]



              dysfunctio                                                  Medica

l



              n type                                                  Center

 

       No known No known Disease                                           Unive

rs



       active active                                                  ity of



       problems problems                                                  [facility]







Allergies, Adverse Reactions, Alerts







       Allergy Allergy Status Severity Reaction(s) Onset  Inactive Treating Comm

ents 

Source



       Name   Type                        Date   Date   Clinician        

 

       NO KNOWN Drug   Active                                           [facility]



       ALLERGIE Class                                                   ity of



       S                                                              [facility]







Social History







           Social Habit Start Date Stop Date  Quantity   Comments   Source

 

           History of Tobacco                                             Common

 [facility] -



           Use                                                    [facility]

 

           Exposure to 2022 Not sure              [facility] 



           SARS-CoV-2 (event) 00:00:00   15:01:00                         [facility]

 

           Alcohol intake 2021 Current drinker            [facility]

dist



                      00:00:00   00:00:00   of alcohol            Hospital



                                            (finding)             

 

           Cigarettes smoked 2021                       [facility]

st



           current (pack per 00:00:00   00:00:00                         Hospita

l



           day) - Reported                                             

 

           Cigarette  2021                       [facility]



           pack-years 00:00:00   00:00:00                         Hospital

 

           Alcohol Comment 2021 rare                  [facility]



                      00:00:00   00:00:00                         Hospital

 

           Tobacco use and 2021 Smokeless             [facility]

y of



           exposure   00:00:00   00:00:00   tobacco non-user            [facility]

 

           Sex Assigned At 1955                       [facility]



           Birth      00:00:00   00:00:00                         Hospital









                Smoking Status  Start Date      Stop Date       Source

 

                Former Smoker   2022 00:00:00 2022 00:00:00 [facility]







Medications







       Ordered Filled Start  Stop   Current Ordering Indication Dosage Frequency

 Signature

                    Comments            Components          Source



     Medication Medication Date Date Medication? Clinician                (SIG) 

          



     Name Name                                                   

 

     Gabapentin Gabapentin       No             1{capsu TID  Gabapentin   

        



     100  MG 9-27                     le_as_n      100 MG           



               00:00:                     eeded}                     



                                                               

 

     Gabapentin Gabapentin -      No             1{capsu TID  Gabapentin   

        



     100  MG 9-27                     le_as_n      100 MG           



               00:00:                     eeded}                     



                                                               

 

     gabapentin      -      Yes       [id number] 100mg      Take 1          

 [facility]



     100 mg      9-16                               capsule by           ity of



     capsule      00:00:                               mouth in           [place]



               00                                 the            Medical



                                                  morning           [facility]anch



                                                  and 1           



                                                  capsule at           



                                                  noon and 1           



                                                  capsule in           



                                                  the            



                                                  evening.           

 

     gabapentin      -      Yes       [id number] 100mg      Take 1          

 [facility]



     100 mg      9-16                               capsule by           ity of



     capsule      00:00:                               mouth in           [place]



               00                                 the            Medical



                                                  morning           Branch



                                                  and 1           



                                                  capsule at           



                                                  noon and 1           



                                                  capsule in           



                                                  the            



                                                  evening.           

 

     estradiol      -0      Yes                                     [facility]



     valerate 20      8-08                                              ity of



     mg/mL      00:00:                                              [place]



     injection                                                      Medical



                                                                 Branch

 

     estradiol      -0      Yes                                     [facility]



     valerate 20      8-08                                              ity of



     mg/mL      00:00:                                              [place]



     injection                                                      Medical



                                                                 Branch

 

     tadalafiL 5      -0      Yes                      TAKE 1           Univ

ers



     mg tablet      8-05                               TABLET BY           ity o

f



               00:00:                               MOUTH           [place]



                                                EVERY DAY           Medical



                                                  FOR 30           Branch



                                                  DAYS           

 

     tadalafiL 5      -0      Yes                      TAKE 1           Univ

ers



     mg tablet      8-05                               TABLET BY           ity o

f



               00:00:                               MOUTH           [place]



               00                                 EVERY DAY           Medical



                                                  FOR 30           Branch



                                                  DAYS           

 

     Tadalafil 5 Tadalafil 5 -2022- No             1{table QD   Tadalafil 

          



     MG   MG   -05 10-04                t}        5 MG           



               00:00: 00:00                                         



               00   :00                                          

 

     Tadalafil 5 Tadalafil 5 -0 - No             1{table QD   Tadalafil 

          



     MG   MG   -05 10-04                t}        5 MG           



               00:00: 00:00                                         



               00   :00                                          

 

     Tadalafil 5 Tadalafil 5 -0 - No             1{table QD   Tadalafil 

          



     MG   MG   -05 10-04                t}        5 MG           



               00:00: 00:00                                         



               00   :00                                          

 

     Tadalafil 5 Tadalafil 5 -2022- No             1{table QD   T[employer]afil 

          



     MG   MG   8-05 10-04                t}        5 MG           



               00:00: 00:00                                         



               00   :00                                          

 

     hydrOXYzine      -0      Yes            50mg      Take 50 mg           

Univers



     50 mg      8-19                               by mouth.           ity of



     tablet      00:00:                                              [place]



                                                               [facility]

 

     hydrOXYzine      -0      Yes            50mg      Take 50 mg           

Univers



     50 mg      8-19                               by mouth.           ity of



     tablet      00:00:                                              [place]



                                                               [facility]

 

     hydrOXYzine      0      Yes            50mg QD   Take 50 mg           

[facility]



     (ATARAX) 50      8-19                               by mouth           st



     MG tablet      00:00:                               nightly.           Hosp

elaine



               00                                                l

 

     SERTraline      0      Yes            100mg      Take 100           Un

brady



     100 mg      8-12                               mg by           ity of



     tablet      00:00:                               mouth.           [place]



                                                               [facility]

 

     SERTraline      0      Yes            100mg      Take 100           Un

brady



     100 mg      8-12                               mg by           ity of



     tablet      00:00:                               mouth.           [place]



                                                               [facility]

 

     sertraline      0      Yes            100mg QD   Take 100           Me

thodi



     (ZOLOFT)      8-12                               mg by           st



     100 MG      00:00:                               mouth           Hospita



     tablet      00                                 daily.           l

 

     spironolact      0      Yes            50mg      Take 50 mg           

Univers



     one 50 mg      8-03                               by mouth.           ity o

f



     tablet      00:00:                                              [place]



                                                               [facility]

 

     spironolact      -0      Yes            50mg      Take 50 mg           

Univers



     one 50 mg      8-03                               by mouth.           ity o

f



     tablet      00:00:                                              [place]



                                                               [facility]

 

     spironolact      -0      Yes            50mg QD   Take 50 mg           

[facility]



     one       8-03                               by mouth           st



     (ALDACTONE)      00:00:                               daily.           Hosp

elaine



     50 MG      00                                                l



     tablet                                                        

 

     No known            No                                      [facility]



     medications      -                                              ity of



               11:16:                                              [address]

 

     benzonatate      -2021- No        [id number] 100mg      Take 1         

  Univers



     (TESSALON                                capsule by           ity

 of



     PERLES) 100      00:00: 05:59                          mouth 3           [place]

[place]



     mg capsule      00   :00                           (three)           Medica

l



                                                  times           Branch



                                                  daily for           



                                                  14 days.           

 

     azithromyci      2021- No        [id number] 250mg      Take 1         

  Univers



     n         2-09 02-15                          tablet by           ity of



     (ZITHROMAX      00:00: 05:59                          mouth           [place]



     Z-MARCIE) 250      00   :00                           daily for           Medi

sandro



     mg tablet                                         5 days.           Branch



                                                  Take 500           



                                                  mg day 1,           



                                                  then 250           



                                                  mg days 2           



                                                  to 5.           

 

     Gabapentin Gabapentin           No                       Gabapentin        

   



     100  MG                                    100 MG           

 

     Tadalafil 5 Tadalafil 5           No                       Tadalafil       

    



     MG   MG                                      5 MG           

 

     Gabapentin Gabapentin           No                       Gabapentin        

   



     100  MG                                    100 MG           

 

     Tadalafil 5 Tadalafil 5           No                       Tadalafil       

    



     MG   MG                                      5 MG           







Immunizations







           Ordered    Filled Immunization Date       Status     Comments   [facility]

e



           Immunization Name Name                                        

 

           SARS-COV-2 COVID-19            2022-10-28 Completed             Unive

rsity of



           KAREEM-SUCROSE            00:00:00                         [place] [facility]

l



           VACCINE 12 YRS+,                                             Br[facility]



           BIVALENT 0.3ML, IM,                                             



           (PFIZER GRAY TOP                                             



           BOOSTER)                                               

 

           Shingrix   Shingrix   2022-03-15 Completed             [facility] 

-



                                 14:21:00                         [facility]

 

           Shingrix   Shingrix   2022-03-15 Completed             [facility] 

-



                                 14:21:00                         [facility]

 

           Shingrix   Shingrix   2022-03-15 Completed             [facility] 

-



                                 14:21:00                         [facility]

 

           Shingrix   Shingrix   2022-03-15 Completed             [facility] 

-



                                 14:21:00                         [facility]

 

           Shingrix   Shingrix   2022-03-15 Completed             [facility] 

-



                                 14:21:00                         [facility]

 

           Shingrix   Shingrix   2022-03-15 Completed             [facility] 

-



                                 14:21:00                         [facility]

 

           Shingrix   Shingrix   2022-03-15 Completed             [facility] 

-



                                 14:21:00                         [facility]

 

           Shingrix   Shingrix   2022-03-15 Completed             [facility] 

-



                                 14:21:00                         [facility]

 

           Shingrix   Shingrix   2022-03-15 Completed             [facility] 

-



                                 14:21:00                         [facility]

 

           Shingrix   Shingrix   2022-03-15 Completed             [facility] 

-



                                 14:21:00                         [facility]

 

           Shingrix   Shingrix   2022-03-15 Completed             [facility] 

-



                                 14:21:00                         [facility]

 

           Pneumovax (PPSV23) Pneumovax (PPSV23) 2022-03-15 Completed           

  [facility] -



                                 14:19:00                         [facility]

 

           Pneumovax (PPSV23) Pneumovax (PPSV23) 2022-03-15 Completed           

  [facility] -



                                 14:19:00                         [facility]

 

           Pneumovax (PPSV23) Pneumovax (PPSV23) 2022-03-15 Completed           

  [facility] -



                                 14:19:00                         [facility]

 

           Pneumovax (PPSV23) Pneumovax (PPSV23) 2022-03-15 Completed           

  [facility] -



                                 14:19:00                         [facility]

 

           Pneumovax (PPSV23) Pneumovax (PPSV23) 2022-03-15 Completed           

  [facility] -



                                 14:19:00                         [facility]

 

           Pneumovax (PPSV23) Pneumovax (PPSV23) 2022-03-15 Completed           

  [facility] -



                                 14:19:00                         [facility]

 

           Pneumovax (PPSV23) Pneumovax (PPSV23) 2022-03-15 Completed           

  [facility] -



                                 14:19:00                         [facility]

 

           Pneumovax (PPSV23) Pneumovax (PPSV23) 2022-03-15 Completed           

  [facility] -



                                 14:19:00                         [facility]

 

           Pneumovax (PPSV23) Pneumovax (PPSV23) 2022-03-15 Completed           

  [facility]



                                 14:19:00                         [facility]

 

           Pneumovax (PPSV23) Pneumovax (PPSV23) 2022-03-15 Completed           

  [facility]



                                 14:19:00                         [facility]

 

           Pneumovax (PPSV23) Pneumovax (PPSV23) 2022-03-15 Completed           

  [facility]



                                 14:19:00                         [facility]

 

           SARS-COV-2 COVID-19            2021-10-27 Completed             Unive

rsity of



           PFIZER VACCINE            00:00:00                         [facility] [place]

 

           SARS-COV-2 COVID-19            2021-10-27 Completed             Unive

rsity of



           PFIZER VACCINE            00:00:00                         [facility]

 

           SARS-COV-2 COVID-19            2021-10-27 Completed             Unive

rsity of



           PFIZER VACCINE            00:00:00                         [facility]

 

           SARS-COV-2 COVID-19            2021-03-15 Completed             Unive

rsity of



           PFIZER VACCINE            00:00:00                         [facility]

 

           SARS-COV-2 COVID-19            2021-03-15 Completed             Unive

rsity of



           PFIZER VACCINE            00:00:00                         [facility]

 

           SARS-COV-2 COVID-19            2021-03-15 Completed             Unive

rsity of



           PFIZER VACCINE            00:00:00                         [facility]

 

           SARS-COV-2 COVID-19            2021 Completed             Unive

rsity of



           PFIZER VACCINE            00:00:00                         [facility]

 

           SARS-COV-2 COVID-19            2021 Completed             Unive

rsity of



           PFIZER VACCINE            00:00:00                         [facility]

 

           SARS-COV-2 COVID-19            2021 Completed             Unive

rsity of



           PFIZER VACCINE            00:00:00                         [place] [facility]

s[facility]



                                                                  Branch







Vital Signs







             Vital Name   Observation Time Observation Value Comments     Source

 

             height       2022-10-05 11:30:00 73.00 [in_i]              [facility]

 

             weight       2022-10-05 11:30:00 270 [lb_av]               [facility]

 

             temperature  2022-10-05 11:30:00 98.5 [degF]               [facility]

 

             bmi          2022-10-05 11:30:00 35.62 kg/m2               [facility]

 

             blood pressure 2022-10-05 11:30:00 142 mm[Hg]                [facility]



             systolic                                            [facility]

 

             blood pressure 2022-10-05 11:30:00 70 mm[Hg]                 [facility]



             diastolic                                           [facility]

 

             height       2022 09:20:00 73.00 [in_i]              [facility]

 

             weight       2022 09:20:00 282 [lb_av]               [facility]

 

             temperature  2022 09:20:00 97.5 [degF]               [facility]

 

             bmi          2022 09:20:00 37.2 kg/m2                [facility]

 

             oximetry     2022 09:20:00 96 %                      [facility]

 

             respiratory rate 2022 09:20:00 17 /min                   [facility]

[facility] -



                                                                 [facility]

 

             blood pressure 2022 09:20:00 150 mm[Hg]                [facility]



             systolic                                            [facility]

 

             blood pressure 2022 09:20:00 71 mm[Hg]                 [facility]



             diastolic                                           [facility]

 

             Systolic blood 2022 20:04:00 156 mm[Hg]                Univer

sity of



             [facility]

 

             Diastolic blood 2022 20:04:00 76 mm[Hg]                 Unive

rsity of



             [facility]

 

             Heart rate   2022 20:02:00 60 /min                   [facility]

 

             Body temperature 2022 20:02:00 36.72 Ashely                 [facility]

 

             Respiratory rate 2022 20:02:00 18 /min                   [facility]

 

             Body height  2022 20:02:00 185.4 cm                  [facility]

 

             Body weight  2022 20:02:00 127.325 kg                [facility]

 

             BMI          2022 20:02:00 37.03 kg/m2               [facility]

 

             Oxygen saturation in 2022 20:02:00 98 /min                   

[facility]



             Arterial blood by                                        [place] [facility]

s[facility]



             Pulse oximetry                                        Branch

 

             temperature  2022 07:40:00 98 [degF]                 [facility]

[facility]

 

             bmi          2022 07:40:00 36.28 kg/m2               [facility]

[facility] -



                                                                 [facility]

 

             blood pressure 2022 07:40:00 134 mm[Hg]                [facility]



             systolic                                            [facility]

 

             blood pressure 2022 07:40:00 70 mm[Hg]                 [facility]



             diastolic                                           [facility]

 

             height       2022 07:40:00 73.00 [in_i]              [facility]

 

             weight       2022 07:40:00 275 [lb_av]               [facility]

[facility]

 

             height       2022-03-15 13:40:00 73.00 [in_i]              [facility]

 

             weight       2022-03-15 13:40:00 274.2 [lb_av]              [facility]

 

             temperature  2022-03-15 13:40:00 98.2 [degF]               [facility]

 

             bmi          2022-03-15 13:40:00 36.17 kg/m2               [facility]

 

             oximetry     2022-03-15 13:40:00 96 %                      [facility]

 

             respiratory rate 2022-03-15 13:40:00 17 /min                   Comm

on [facility]

 

             blood pressure 2022-03-15 13:40:00 132 mm[Hg]                [facility]



             systolic                                            [facility]

 

             blood pressure 2022-03-15 13:40:00 76 mm[Hg]                 [facility]



             diastolic                                           [facility]







Procedures







                Procedure       Date / Time Performed Performing Clinician Sourc

e

 

                SARS-COV-2 COVID-19 2022-10-28 14:51:57 Doctor Unassigned, No [facility] 

[place]



                KAREEM-SUCROSE VACCINE                 Name            [facility]

[facility]



                12 YRS+, BIVALENT                                 



                0.3ML, IM, (PFIZER                                 



                GRAY TOP BOOSTER)                                 

 

                SARS-COV-2 COVID-19 2021-10-27 14:52:31 Doctor Unassigned, No [facility] 

[place]



                VACCINE,0.3ML,IM                 Name            Medical Branch



                (PFIZER)                                        







Plan of Care







             Planned Activity Planned Date Details      Comments     Source

 

             Future Scheduled 2022   Hepatitis C screening              [facility]



             Test         22:32:02     (procedure) [code =              



                                       147874156]                

 

             Future Scheduled 2022   COLONOSCOPY SCREENING              [facility]



             Test         22:32:02     [code = COLONOSCOPY              



                                       SCREENING]                

 

             Future Scheduled 2022   SHINGLES VACCINES (1              Met

[facility]



             Test         22:32:02     of 2) [code = SHINGLES              



                                       VACCINES (1 of 2)]              

 

             Future Scheduled 2022   65+ PNEUMOCOCCAL              Methodi

[facility]



             Test         22:32:02     VACCINE (1 - PCV)              



                                       [code = 65+               



                                       PNEUMOCOCCAL VACCINE              



                                       (1 - PCV)]                

 

             Future Scheduled 2022   COVID-19 VACCINE (3 -              [facility]



             Test         22:32:02     Booster for Pfizer              



                                       series) [code =              



                                       COVID-19 VACCINE (3 -              



                                       Booster for Pfizer              



                                       series)]                  

 

             Future Scheduled 2022   INFLUENZA VACCINE              Method

[facility]



             Test         22:32:02     [code = INFLUENZA              



                                       VACCINE]                  







Encounters







        Start   End     Encounter Admission Attending Care    Care    Encounter 

Source



        Date/Time Date/Time Type    Type    Clinicians Facility Department ID   

   

 

        2022-10-05         Outpatient         Martinez,  [facility]  [id number]

 Common



        10:59:02                         Joseluis                     [facility]

 

        2022         Outpatient         Martinez,  [facility]  [id number]

 Common



        09:10:02                         Joseluis                     [facility]

 

        2022-03-15         Outpatient         Martinez,  [facility]  [id number]

 Common



        13:31:03                         Joseluis                     [facility]

 

        2022-10-28 2022-10-28 Imm/Inj         Vaccine, [facility]

ZENY [id number] 

[id number]                                Univers



        09:20:00 09:30:00 Visit           Wong Harris [id number]         

ity of



                                                PEDIATRIC [id number]         [facility]  [phone]         [address]

 

        2022-10-28 2022-10-28 Outpatient R       WONG HARRIS [facility]    [id number]

[id number] Univers



        09:20:00 09:20:00                                                 ity of



                                                                        [facility]

 

        2022-10-05 2022-10-05 [facility]  [id number] Co

mmon



        00:00:00 00:00:00 VISIT EST                                         Spir

it



                        PT LEVEL 3                                         [facility]

 

        2022 OFFICE                  [facility]  [facility]  [id number] Co

mmon



        00:00:00 00:00:00 VISIT EST                                         Spir

it



                        PT LEVEL 3                                         [facility]

 

        2022 [facility]TEL[facility]                   [facility]  [id number] Co

mmon



        00:00:00 00:00:00                                                 [facility]

 

        2022 Urgent          Ladonna Bueno [facility]    [id number]

[id number] Univers



        15:20:00 15:20:00 Param Carlson Trellnataliia [facility]  350.1.13

.10         [facility] [id number]         Charly

as



                                                EVELIO?BLEA [phone]         [address]



                                                MEDICAL                 



                                                OFFICE                  



                                                BUILDING                 

 

        2022 Outpatient DEVYN CARLSON [facility]    [id number]

[id number] Univers



        15:20:00 15:16:40                 ERUM                         [facility]

 

        2022 (TEL)                   [facility]  [facility]  [id number] Co

mmon



        00:00:00 00:00:00                                                 [facility]

 

        2022 OFFICE                  [facility]  [facility]  [id number] Co

mmon



        00:00:00 00:00:00 VISIT                                           [facility]



                        ESTAB PT                                         - [facility]



                        LEVEL 4                                         [facility]

 

        2022-07-15 2022-07-15 Outpatient                 [facility]     DM     [id number]

[id number] [facility]



        06:09:00 06:09:00                                         0715    Medica

l



                                                                        Group

 

        2022-06-15 2022-06-15 (TEL)                   [facility]  [facility]  [id number] Co

mmon



        00:00:00 00:00:00                                                 [facility]

 

        2022 (TEL)                   [facility]  [facility]  [id number] Co

mmon



        00:00:00 00:00:00                                                 [facility]

 

        2022-03-15 2022-03-15 PREV VISIT                 [facility]  [facility]  [id number]

 Common



        00:00:00 00:00:00 NEW AGE 64                                         Spi

rit



                        & OVER                                          [facility]

 

        2021 Outpatient                 [facility]     DM     [id number]

021 [facility]



        08:00:00 08:00:00                                         1104    Medica

l



                                                                        Group

 

        2021-10-27 2021-10-27 Outpatient                 [facility]    [id number]

[id number] Univers



        10:10:00 10:10:00                                                 [facility]

 

        2021-10-27 2021-10-27 Imm/Inj         Nurse, Adc Pob Immunization [facility]  

  [id number] 

[id number]                                Univers



        09:51:22 09:52:03 Visit           Jarad Lerma [place] 350.1.13

.10         ity of



                                                [place] [id number]         [place]

mathieu Knowles [phone]         Me

dical



                                                nal     421             [facility]                 

 

        2021 Outpatient         ANYI, [facility]     021     [id number]

[address]



        00:00:00 00:00:00                 EDWARD                  289     Method

i



                                                                        

 

        2021 Outpatient         ANYI [facility]     [id number]

[address]



        00:00:00 00:00:00                 EDWARD                  337     Method

i



                                                                        

 

        [facility]         [facility]     021     [id number]

[address]



        00:00:00 00:00:00                 EDWARD                  376     Method

i



                                                                        

 

        2021 Outpatient         ANYI [facility]     [id number]

[address]



        00:00:00 00:00:00                 EDWARD                  665     Method

i



                                                                        

 

        2021-03-15 2021-03-15 Outpatient DEVYN MILLER [facility]    [id number]

[id number] Univers



        07:50:00 07:50:00                 [facility]AD                             [facility]

 

        [facility]    [id number]

[id number] Univers



        08:00:00 08:00:00                 ABAD                             [facility]

 

        2021 Telephone         Pcp,    [facility]    [id number]

[id number] Univers



        00:00:00 00:00:00                 Patient Health  [id number]         it

y of



                                        Does Not Surgical [id number]         Te

xas



                                        Have A  Specialti [phone]         Me

dical



                                                es      370             Branch



                                                [place]                 

 

        2021 Outpatient DEVYN MÁRQUEZ  [facility]    [id number]

[id number] Univers



        10:40:00 12:12:41                 ROSCOE weiss [facility]







Results







           Test Description Test Time  Test Comments Results    Result Comments 

Source









                          SARS-CoV-2 (COVID-19) RNA [Presence] in Respiratory sp

ecimen by 2021 

11:49:11                                



                    ADRIANA with probe detection                     









                      Test Item  Value      Reference Range Interpretation Comme

nts









             SARS-CoV-2 (COVID-19) RNA [Presence] in Respiratory Not detected No

t-Detected              



             specimen by ADRIANA with probe detection (test code =                  

                      



             73619-2)                                            

 

             Whether patient is employed in a healthcare setting                

                        



             (test code = 28479-5)                                        

 

             Whether the patient has symptoms related to condition              

                          



             of interest (test code = 94605-8)                                  

      

 

             Patient was hospitalized because of this condition                 

                       



             (test code = 01770-9)                                        

 

             Whether the patient was admitted to intensive care unit            

                            



             (ICU) for condition of interest (test code = 24417-4)              

                          

 

             Whether patient resides in a congregate care setting               

                         



             (test code = 21400-8)                                        



JULI CALDERÓNRS-CoV-2 (COVID-19) RNA [Presence] in Respiratory 
specimen by ADRIANA with probe [id number] 12:43:50





             Test Item    Value        Reference Range Interpretation Comments

 

             SARS-CoV-2 (COVID-19) RNA Not detected Not-Detected              



             [Presence] in Respiratory                                        



             specimen by ADRIANA with probe                                        



             detection (test code = 52259-9)                                    

    

 

             Whether patient is employed in a                                   

     



             healthcare setting (test code =                                    

    



             35980-8)                                            

 

             Whether the patient has symptoms                                   

     



             related to condition of interest                                   

     



             (test code = 44779-9)                                        

 

             Patient was hospitalized because                                   

     



             of this condition (test code =                                     

   



             67830-5)                                            

 

             Whether the patient was admitted                                   

     



             to intensive care unit (ICU) for                                   

     



             condition of interest (test code                                   

     



             = 25589-2)                                          

 

             Whether patient resides in a                                       

 



             congregate care setting (test                                      

  



             code = 18494-5)                                        



[facility]

## 2022-12-22 NOTE — RAD REPORT
EXAM DESCRIPTION:  CTAbdomen   Pelvis W Contrast - 12/22/2022 6:54 am

 

CLINICAL HISTORY:  Abdominal pain.

R sided groin and lower back pain

 

COMPARISON:  No comparisonsAbdomen   Pelvis W Contrast dated 10/30/2021; CT ABD PELVIS W CONTRAST kenya
ed 4/4/2015

 

TECHNIQUE:  Biphasic CT imaging of the abdomen and pelvis was performed with 100 ml non-ionic IV cont
rast.

 

All CT scans are performed using dose optimization technique as appropriate and may include automated
 exposure control or mA/KV adjustment according to patient size.

 

FINDINGS:  Emphysematous lung bases.Small hiatal hernia

 

The liver, spleen, pancreas, adrenal glands and kidneys are within normal limits. Cholecystectomy cli
ps.

 

No bowel obstruction, free air, free fluid or abscess. Prominent diverticulosis coli is present invol
ving the sigmoid colon with moderate fecal retention. No diverticulitis findings. The appendix is nor
mal.  No evidence of significant lymphadenopathy. Small right inguinal hernia. Previous umbilical her
lavon repair.

 

Mild lumbar degenerative changes.

 

IMPRESSION:  No acute intra-abdominal or pelvic finding.

 

Prominent sigmoid diverticulosis coli without diverticulitis.

 

Small fat containing right inguinal hernia.

## 2022-12-22 NOTE — EDPHYS
Physician Documentation                                                                           

 [facility]                                                                 

Name: Esdras Collier                                                                                

Age: 67 yrs                                                                                       

Sex: Male                                                                                         

: 1955                                                                                   

MRN: [id number]                                                                                   

Arrival Date: 2022                                                                          

Time: 05:16                                                                                       

Account#: [id number]                                                                            

Bed 6                                                                                             

Private MD:                                                                                       

ED Physician Shawn Lacy                                                                        

HPI:                                                                                              

                                                                                             

05:34 This 67 yrs old Male presents to ER via Wheelchair with complaints of RT Side Pain,     sd2 

      Nausea/Vomiting.                                                                            

05:34 68 yo M presents with CC of R sided groin pain radiating up into his R lower abdomen    sd2 

      and around to his R side lower back area that started a few hours PTA. Pt reports           

      similar pain many years ago with a negative workup. Denies fever, diarrhea or urinary       

      symptoms. .                                                                                 

                                                                                                  

Historical:                                                                                       

- Allergies:                                                                                      

05:32 No Known Allergies;                                                                     as6 

- Home Meds:                                                                                      

05:32 None [Active];                                                                          as6 

- PMHx:                                                                                           

05:32 None;                                                                                   as6 

- PSHx:                                                                                           

05:32 hernia;                                                                                 as6 

                                                                                                  

- Immunization history:: Client reports receiving the 2nd dose of the Covid vaccine,              

  moderna Flu vaccine is up to date.                                                              

- Social history:: Smoking status: Patient denies any tobacco usage or history of.                

                                                                                                  

                                                                                                  

ROS:                                                                                              

05:34 Constitutional: Negative for fever, chills, and weight loss, Eyes: Negative for injury, sd2 

      pain, redness, and discharge, Cardiovascular: Negative for chest pain, palpitations,        

      and edema, Respiratory: Negative for shortness of breath, cough, wheezing. Abdomen/GI:      

      Positive for abdominal pain, nausea, vomiting, Negative for diarrhea. Back: Negative        

      for injury and positive for pain, : Negative for dysuria, frequency or hematuria.         

      MS/Extremity: Negative for injury and deformity, Skin: Negative for injury, rash, and       

      discoloration, Neuro: Negative for headache, numbness and tingling.                         

                                                                                                  

Exam:                                                                                             

05:34 Head/Face:  Normocephalic, atraumatic. Eyes:  EOMI, normal conjunctiva bilaterally      sd2 

      Chest/axilla:  Normal chest wall appearance and motion.  Nontender with no deformity.       

      Cardiovascular:  Regular rate and rhythm with a normal S1 and S2.  No gallops, murmurs,     

      or rubs.  2+ distal pulses. Respiratory:  Lungs have equal breath sounds bilaterally,       

      clear to auscultation and percussion.  No rales, rhonchi or wheezes noted.  No              

      increased work of breathing, no retractions or nasal flaring. Abdomen/GI:  Soft,            

      non-tender, with normal bowel sounds. No guarding or rebound.  No evidence of               

      tenderness throughout. No CVA tenderness. Back:  No spinal tenderness.  No                  

      costovertebral tenderness.  Full range of motion. Male :  Normal genitalia with no        

      discharge or lesions. No scrotal swelling or edema. No inguinal LAD or hernia               

      appreciated Skin:  Warm, dry with normal turgor.  Normal color with no rashes, no           

      lesions, and no evidence of cellulitis. MS/ Extremity:  Pulses equal, no cyanosis.          

      Neurovascular intact.  Full, normal range of motion. Ambulatory without difficulty.         

      Psych:  Awake, alert, with orientation to person, place and time.  Behavior, mood, and      

      affect are within normal limits.                                                            

05:34 Constitutional: The patient appears in no acute distress, alert, awake, well developed,     

      uncomfortable.                                                                              

05:34 ECG was reviewed by the Attending Physician. NSR, rate 60, no STEMI criteria                

                                                                                                  

Vital Signs:                                                                                      

05:30  / 75; Pulse 62; Resp 17 S; Temp 98.0(O); Pulse Ox 95% on R/A; Weight 122.47 kg   as6 

      (R); Height 6 ft. 0 in. (182.88 cm) (R); Pain 10/10;                                        

06:04  / 88; Pulse 62; Resp 19; Pulse Ox 92% on R/A;                                    kd3 

07:04  / 67; Pulse 67; Resp 18 S; Pulse Ox 92% on R/A;                                  kc6 

08:04  / 70; Pulse 60; Resp 20 S; Pulse Ox 90% on R/A; Pain 8/10;                       kc6 

05:30 Body Mass Index 36.62 (122.47 kg, 182.88 cm)                                            as6 

                                                                                                  

MDM:                                                                                              

05:21 Patient medically screened.                                                             sd2 

07:03 Transition of care: After a detail discussion of the patient's case, care is            sd2 

      transferred to Shawn Lacy MD.                                                             

08:16 Differential diagnosis: Nonspecific abd pain, strain on R inguinal hernia. Data         jr11

      reviewed: vital signs, nurses notes, radiologic studies. ED course: Pain controlled, to     

      f/u surgery .                                                                               

                                                                                                  

                                                                                             

05:27 Order name: CBC with Diff; Complete Time: 06:40                                         sd2 

                                                                                             

05:27 Order name: CMP; Complete Time: 06:11                                                   sd2 

                                                                                             

05:27 Order name: Magnesium; Complete Time: 06:11                                             sd2 

                                                                                             

05:27 Order name: Troponin High Sensitivity; Complete Time: 06:11                             sd2 

                                                                                             

05:27 Order name: BNP; Complete Time: 06:11                                                   sd2 

                                                                                             

05:27 Order name: Lipase; Complete Time: 06:11                                                sd2 

                                                                                             

05:27 Order name: Urine Microscopic Only; Complete Time: 07:32                                sd2 

                                                                                             

05:27 Order name: CT Abd/Pelvis - IV Contrast Only; Complete Time: 08:15                      sd2 

                                                                                             

07:19 Order name: Urine Dipstick-Ancillary; Complete Time: 07:28                              EDMS

                                                                                             

05:27 Order name: EKG - Nurse/Tech; Complete Time: 05:40                                      sd2 

                                                                                             

05:27 Order name: Urine Dipstick-Ancillary (obtain specimen); Complete Time: 07:19            sd2 

                                                                                                  

Administered Medications:                                                                         

05:50 Drug: NS 0.9% 1000 ml Route: IV; Rate: 1 bolus; Site: right antecubital;                as6 

07:20 Follow up: Response: No adverse reaction; IV Status: Completed infusion; IV Intake:     kc6 

      1000ml                                                                                      

05:50 Drug: morphine 4 mg Route: IVP; Infused Over: 4 mins; Site: right antecubital;          as6 

07:20 Follow up: Response: No adverse reaction; Pain is unchanged, physician notified; RASS:  kc6 

      Alert and Calm (0)                                                                          

05:50 Drug: Zofran (Ondansetron) 4 mg Route: IVP; Site: right antecubital;                    as6 

07:20 Follow up: Response: No adverse reaction; Nausea is decreased                           kc6 

06:27 Drug: morphine 4 mg Route: IVP; Infused Over: 4 mins; Site: right antecubital;          kd3 

07:21 Follow up: Response: No adverse reaction; Pain is decreased; RASS: Alert and Calm (0)   kc6 

06:27 Drug: Ketorolac 15 mg Route: IVP; Site: right antecubital;                              kd3 

07:21 Follow up: Response: No adverse reaction; Pain is decreased; RASS: Alert and Calm (0)   kc6 

07:48 Drug: Reglan (metoCLOPramide) 10 mg Route: IVP; Site: right antecubital;                kc6 

08:22 Follow up: Response: No adverse reaction; Nausea is decreased                           kc6 

07:48 Drug: morphine 4 mg Route: IVP; Infused Over: 4 mins; Site: right antecubital;          kc6 

08:21 Follow up: Response: No adverse reaction; Pain is decreased; RASS: Alert and Calm (0)   kc6 

                                                                                                  

                                                                                                  

Disposition Summary:                                                                              

22 08:18                                                                                    

Discharge Ordered                                                                                 

      Location: Home                                                                          [facility]

      Condition: Fair                                                                         jr11

      Diagnosis                                                                                   

        - Inguinal hernia pain, Right lower abdominal pain                                    jr11

      Discharge Instructions:                                                                     

        - Discharge Summary Sheet                                                             jr11

        - Inguinal Hernia, Adult                                                              jr11

      Forms:                                                                                      

        - Medication Reconciliation Form                                                      jr11

        - Thank You Letter                                                                    jr11

        - Antibiotic Education                                                                jr11

        - Prescription Opioid Use                                                             jr11

      Prescriptions:                                                                              

        - Ibuprofen 600 mg Oral Tablet                                                             

            - take 1 tablet by ORAL route every 6 hours As needed take with food; 20 tablet;  jr11

      Refills: 0, Product Selection Permitted                                                     

Signatures:                                                                                       

Dispatcher [employer]                           Abdelrahman Epps RN                      RN   as6                                                  

Sultana Le RN RN   kd3                                                  

Shawn Lacy MD MD   jr11                                                 

Tracey Moreno MD MD   sd2                                                  

Janis Randall RN                   RN   kc6                                                  

                                                                                                  

Corrections: (The following items were deleted from the chart)                                    

05:33 05:32 PMHx: right groin pain; as6                                                       as6 

05:33 05:32 PMHx: Nausea; as6                                                                 as6 

                                                                                                  

**************************************************************************************************

## 2022-12-24 NOTE — EKG
Test Date:    2022-12-22               Test Time:    05:32:00

Technician:                                          

                                                     

MEASUREMENT RESULTS:                                       

Intervals:                                           

Rate:         60                                     

SC:           160                                    

QRSD:         110                                    

QT:           440                                    

QTc:          440                                    

Axis:                                                

P:            60                                     

SC:           160                                    

QRS:          26                                     

T:            57                                     

                                                     

INTERPRETIVE STATEMENTS:                                       

                                                     

Normal sinus rhythm

Normal ECG

Compared to ECG 04/04/2015 17:13:24

No significant changes



Electronically Signed On 12-24-22 17:25:33 CST by Dawit Ramirez

## 2022-12-30 NOTE — RAD REPORT
EXAM DESCRIPTION:  RAD - Chest Pa And Lat (2 Views) - 12/30/2022 8:59 am

 

CLINICAL HISTORY:  Pre op pending hernia surgery

 

COMPARISON:  Portable chest 04/04/2015, two view chest 12/22/2013

 

TECHNIQUE:  Frontal and lateral views of the chest were obtained.

 

FINDINGS:  The lungs are clear.  Interstitial pattern is similar to the comparison imaging. No hilar 
mass or lymphadenopathy. Heart size is normal and central vasculature is within normal limits.  No pl
eural effusion or pneumothorax seen.  No acute bony finding noted.  No aortic abnormality.  No signif
icant change from comparison study.

 

IMPRESSION:  No acute cardiopulmonary process.

## 2023-01-04 ENCOUNTER — HOSPITAL ENCOUNTER (OUTPATIENT)
Dept: HOSPITAL 97 - OR | Age: 68
Discharge: HOME | End: 2023-01-04
Attending: SURGERY
Payer: MEDICARE

## 2023-01-04 VITALS — OXYGEN SATURATION: 99 % | DIASTOLIC BLOOD PRESSURE: 82 MMHG | SYSTOLIC BLOOD PRESSURE: 153 MMHG | TEMPERATURE: 97.4 F

## 2023-01-04 DIAGNOSIS — K40.90: Primary | ICD-10-CM

## 2023-01-04 PROCEDURE — 88302 TISSUE EXAM BY PATHOLOGIST: CPT

## 2023-01-04 PROCEDURE — 49505 PRP I/HERN INIT REDUC >5 YR: CPT

## 2023-01-04 PROCEDURE — 0YU50JZ SUPPLEMENT RIGHT INGUINAL REGION WITH SYNTHETIC SUBSTITUTE, OPEN APPROACH: ICD-10-PCS

## 2023-01-04 PROCEDURE — 71046 X-RAY EXAM CHEST 2 VIEWS: CPT

## 2023-01-04 RX ADMIN — SODIUM CHLORIDE, SODIUM LACTATE, POTASSIUM CHLORIDE, AND CALCIUM CHLORIDE ONE MLS: .6; .31; .03; .02 INJECTION, SOLUTION INTRAVENOUS at 07:40

## 2023-01-04 NOTE — P.OP
Date of Service: 01/04/23


Preop diagnosis: Right inguinal hernia





Postop diagnosis: Same





Procedure performed: Repair right inguinal hernia





Surgeon: Cristiano Marino MD





Assistant: Paige JAQUEZ





Estimated blood loss: Minimal





Specimen: Cord lipoma





Findings: As above





Anesthesia: General





Complications: None





Drains: None





Fluids and blood products: Nonapplicable





Disposition: Recovery room





Operative note: Patient brought to the OR and placed in supine position.  

General anesthesia begun.  Patient prepped and draped in the usual sterile 

fashion.  Marcaine 0.5% infiltrated in the right groin in a field block fashion.

 15 blade used to make a 4 cm incision between the pubic tubercle and the 

anterior iliac superior spine.  Subcutaneous tissue divided.  Juan Carlos's fascia 

identified and divided.  Aponeurosis identified which was attenuated.  Cord 

structures and ilioinguinal nerve identified and mobilized at the pubic 

tubercle.  Ilioinguinal nerve retracted out of the field of dissection.  Cord 

skeletonized.  A large cord lipoma present but no indirect sac identified.  

Lipoma excised at the base with 2-0 Prolene suture ligature and freehand tie.  

Specimen sent to pathology.  Marlex mesh plug placed in the internal ring and 

secured with VersaTack stapler.  Onlay mesh placed on the inguinal floor and 

secured with VersaTack staplemedially to the pubic tubercle, superior to the 

conjoined tendon, laterally to each other and inferiorly to the shelving edge.  

Cord structures ileoinguinal nerve placed back in anatomic location Juan Carlos 

fascia closed with 3-0 chromic and 3-0 chromic used to approximate subcutaneous 

tissue in close skin.  Sterile dressing applied.  Patient awakened and taken to 

recovery room in good general condition.





CC: Dr. Martinez's office